# Patient Record
Sex: MALE | Race: WHITE | Employment: OTHER | URBAN - METROPOLITAN AREA
[De-identification: names, ages, dates, MRNs, and addresses within clinical notes are randomized per-mention and may not be internally consistent; named-entity substitution may affect disease eponyms.]

---

## 2018-03-28 ENCOUNTER — APPOINTMENT (OUTPATIENT)
Dept: GENERAL RADIOLOGY | Age: 68
End: 2018-03-28
Attending: EMERGENCY MEDICINE
Payer: MEDICARE

## 2018-03-28 ENCOUNTER — HOSPITAL ENCOUNTER (EMERGENCY)
Age: 68
Discharge: HOME | End: 2018-03-28
Attending: EMERGENCY MEDICINE | Admitting: EMERGENCY MEDICINE
Payer: MEDICARE

## 2018-03-28 VITALS
HEART RATE: 89 BPM | RESPIRATION RATE: 20 BRPM | OXYGEN SATURATION: 95 % | DIASTOLIC BLOOD PRESSURE: 69 MMHG | SYSTOLIC BLOOD PRESSURE: 154 MMHG | TEMPERATURE: 99.3 F

## 2018-03-28 DIAGNOSIS — R05.9 COUGH: Primary | ICD-10-CM

## 2018-03-28 DIAGNOSIS — Z85.05 HISTORY OF LIVER CANCER: ICD-10-CM

## 2018-03-28 LAB
ALBUMIN SERPL-MCNC: 4 G/DL (ref 3.4–5)
ALBUMIN/GLOB SERPL: 0.9 {RATIO} (ref 0.8–1.7)
ALP SERPL-CCNC: 76 U/L (ref 45–117)
ALT SERPL-CCNC: 39 U/L (ref 16–61)
ANION GAP SERPL CALC-SCNC: 6 MMOL/L (ref 3–18)
AST SERPL-CCNC: 55 U/L (ref 15–37)
BASOPHILS # BLD: 0 K/UL (ref 0–0.06)
BASOPHILS NFR BLD: 0 % (ref 0–2)
BILIRUB SERPL-MCNC: 0.7 MG/DL (ref 0.2–1)
BUN SERPL-MCNC: 7 MG/DL (ref 7–18)
BUN/CREAT SERPL: 12 (ref 12–20)
CALCIUM SERPL-MCNC: 8.9 MG/DL (ref 8.5–10.1)
CHLORIDE SERPL-SCNC: 95 MMOL/L (ref 100–108)
CO2 SERPL-SCNC: 28 MMOL/L (ref 21–32)
CREAT SERPL-MCNC: 0.57 MG/DL (ref 0.6–1.3)
DIFFERENTIAL METHOD BLD: ABNORMAL
EOSINOPHIL # BLD: 0 K/UL (ref 0–0.4)
EOSINOPHIL NFR BLD: 0 % (ref 0–5)
ERYTHROCYTE [DISTWIDTH] IN BLOOD BY AUTOMATED COUNT: 13.2 % (ref 11.6–14.5)
GLOBULIN SER CALC-MCNC: 4.4 G/DL (ref 2–4)
GLUCOSE SERPL-MCNC: 91 MG/DL (ref 74–99)
HCT VFR BLD AUTO: 35.2 % (ref 36–48)
HGB BLD-MCNC: 12 G/DL (ref 13–16)
LYMPHOCYTES # BLD: 0.2 K/UL (ref 0.9–3.6)
LYMPHOCYTES NFR BLD: 6 % (ref 21–52)
MCH RBC QN AUTO: 28.9 PG (ref 24–34)
MCHC RBC AUTO-ENTMCNC: 34.1 G/DL (ref 31–37)
MCV RBC AUTO: 84.8 FL (ref 74–97)
MONOCYTES # BLD: 0.4 K/UL (ref 0.05–1.2)
MONOCYTES NFR BLD: 11 % (ref 3–10)
NEUTS SEG # BLD: 3 K/UL (ref 1.8–8)
NEUTS SEG NFR BLD: 83 % (ref 40–73)
PLATELET # BLD AUTO: 87 K/UL (ref 135–420)
PMV BLD AUTO: 10 FL (ref 9.2–11.8)
POTASSIUM SERPL-SCNC: 4.2 MMOL/L (ref 3.5–5.5)
PROT SERPL-MCNC: 8.4 G/DL (ref 6.4–8.2)
RBC # BLD AUTO: 4.15 M/UL (ref 4.7–5.5)
SODIUM SERPL-SCNC: 129 MMOL/L (ref 136–145)
WBC # BLD AUTO: 3.6 K/UL (ref 4.6–13.2)

## 2018-03-28 PROCEDURE — 85025 COMPLETE CBC W/AUTO DIFF WBC: CPT | Performed by: EMERGENCY MEDICINE

## 2018-03-28 PROCEDURE — 71046 X-RAY EXAM CHEST 2 VIEWS: CPT

## 2018-03-28 PROCEDURE — 87040 BLOOD CULTURE FOR BACTERIA: CPT | Performed by: EMERGENCY MEDICINE

## 2018-03-28 PROCEDURE — 74011250637 HC RX REV CODE- 250/637: Performed by: EMERGENCY MEDICINE

## 2018-03-28 PROCEDURE — 80053 COMPREHEN METABOLIC PANEL: CPT | Performed by: EMERGENCY MEDICINE

## 2018-03-28 PROCEDURE — 99283 EMERGENCY DEPT VISIT LOW MDM: CPT

## 2018-03-28 RX ORDER — CETIRIZINE HCL 10 MG
10 TABLET ORAL DAILY
Qty: 10 TAB | Refills: 0 | Status: SHIPPED | OUTPATIENT
Start: 2018-03-28 | End: 2019-03-04

## 2018-03-28 RX ORDER — GUAIFENESIN/DEXTROMETHORPHAN 100-10MG/5
10 SYRUP ORAL
Status: DISCONTINUED | OUTPATIENT
Start: 2018-03-28 | End: 2018-03-29 | Stop reason: HOSPADM

## 2018-03-28 RX ORDER — GUAIFENESIN 600 MG/1
600 TABLET, EXTENDED RELEASE ORAL 2 TIMES DAILY
Qty: 20 TAB | Refills: 0 | Status: SHIPPED | OUTPATIENT
Start: 2018-03-28 | End: 2019-03-04

## 2018-03-28 RX ORDER — DOXYCYCLINE 100 MG/1
100 CAPSULE ORAL
Status: COMPLETED | OUTPATIENT
Start: 2018-03-28 | End: 2018-03-28

## 2018-03-28 RX ORDER — DOXYCYCLINE HYCLATE 100 MG
100 TABLET ORAL 2 TIMES DAILY
Qty: 20 TAB | Refills: 0 | Status: SHIPPED | OUTPATIENT
Start: 2018-03-28 | End: 2018-04-07

## 2018-03-28 RX ADMIN — DOXYCYCLINE 100 MG: 100 CAPSULE ORAL at 20:39

## 2018-03-28 NOTE — ED TRIAGE NOTES
\"For about four days I been coughing and have the chills and runny nose. I think I have pneumonia. The medication Nexavar that I'm on breaks down the immune system. \"

## 2018-04-03 LAB
BACTERIA SPEC CULT: NORMAL
BACTERIA SPEC CULT: NORMAL
SERVICE CMNT-IMP: NORMAL
SERVICE CMNT-IMP: NORMAL

## 2018-10-03 ENCOUNTER — OFFICE VISIT (OUTPATIENT)
Dept: FAMILY MEDICINE CLINIC | Facility: CLINIC | Age: 68
End: 2018-10-03

## 2018-10-03 VITALS
HEIGHT: 66 IN | RESPIRATION RATE: 17 BRPM | TEMPERATURE: 97.3 F | OXYGEN SATURATION: 96 % | HEART RATE: 74 BPM | SYSTOLIC BLOOD PRESSURE: 129 MMHG | WEIGHT: 151 LBS | BODY MASS INDEX: 24.27 KG/M2 | DIASTOLIC BLOOD PRESSURE: 58 MMHG
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
## 2018-10-03 DIAGNOSIS — S61.052A CAT BITE OF LEFT THUMB, INITIAL ENCOUNTER: Primary | ICD-10-CM

## 2018-10-03 RX ORDER — TRAZODONE HYDROCHLORIDE 50 MG/1
TABLET ORAL
COMMUNITY
Start: 2018-09-06 | End: 2019-03-04

## 2018-10-03 RX ORDER — AMOXICILLIN AND CLAVULANATE POTASSIUM 875; 125 MG/1; MG/1
1 TABLET, FILM COATED ORAL 2 TIMES DAILY
Qty: 14 TAB | Refills: 0 | Status: SHIPPED | OUTPATIENT
Start: 2018-10-03 | End: 2018-10-10

## 2018-10-03 NOTE — PROGRESS NOTES
Michel Barger is a 77 y.o.  male presents today for office visit for acute care. Pt would also like to discuss cat bite. Pt is not fasting. Pt is in Room # 9      1. Have you been to the ER, urgent care clinic since your last visit? Hospitalized since your last visit? No    2. Have you seen or consulted any other health care providers outside of the [address] [place] [address] since your last visit? Include any pap smears or colon screening.  No    Upcoming Appts  none    Health Maintenance reviewed     VORB:   Orders Placed This Encounter    traZODone (DESYREL) 50 mg tablet   Annelise Chatman LPN

## 2018-10-03 NOTE — PROGRESS NOTES
Subjective:      Reji Estes is an 77 y.o. male who presents for evaluation of a bite wound to the left thumb. History of bite: cat bite. Injury occurred 7 days ago. The patient reports no coldness, no numbness, no pain, no paresthesias. There were no other injuries. The tetanus status is up to date. He states his cat is up to date on it's immunizations including rabies. Patient states he was playing with his cat, and the cat scratched his left thumb with it's teeth. There was no bleeding, or open wound only a scratch. He states he had three antibiotic pills left over from an old prescription and he took all three. There is no problem list on file for this patient. There are no active problems to display for this patient. Current Outpatient Prescriptions   Medication Sig Dispense Refill    amoxicillin-clavulanate (AUGMENTIN) 875-125 mg per tablet Take 1 Tab by mouth two (2) times a day for 7 days. 14 Tab 0    traZODone (DESYREL) 50 mg tablet        Not on File  Past Medical History:   Diagnosis Date    Cancer ([facility] [address]     Hypertension      History reviewed. No pertinent surgical history. History reviewed. No pertinent family history. Social History   Substance Use Topics    Smoking status: Current Every Day Smoker    Smokeless tobacco: Never Used    Alcohol use No        Objective:        Visit Vitals    /58 (BP 1 Location: Right arm, BP Patient Position: Sitting)    Pulse 74    Temp 97.3 °F (36.3 °C) (Oral)    Resp 17    Ht 5' 6\" (1.676 m)    Wt 151 lb (68.5 kg)    SpO2 96%    BMI 24.37 kg/m2     There is a linear bite wound measuring approximately 0.1 cm in length on the left thumb. Examination of the wound for foreign bodies and devitalized tissue showed none. Examination of the surrounding area for neural or vascular damage showed none. There is no signs or symptoms of infection, the area is scabbed over. Assessment/Plan:       ICD-10-CM ICD-9-CM    1.  Cat bite of left thumb, initial encounter S61.052A 883.0 amoxicillin-clavulanate (AUGMENTIN) 875-125 mg per tablet    W55. 01XA E906.3      Bite as described above. Tetanus immunization indicated: up to date. Rabies risk: none    Follow-up with PCP in 7 days. I have discussed the diagnosis with the patient and the intended plan as seen in the above orders. I have discussed the medication with the patient. The patient has received an after-visit summary along with patient information handout.   Pt verbalized understanding.       Felix TAMAYOC  [facility]  [address] [place], [address]  Phone [phone]  Fax [phone]

## 2018-10-03 NOTE — PATIENT INSTRUCTIONS
Animal Bites: Care Instructions  Your Care Instructions  After an animal bite, the biggest concern is infection. The chance of infection depends on the type of animal that bit you, where on your body you were bitten, and your general health. Many animal bites are not closed with stitches, because this can increase the chance of infection. Your bite may take as little as 7 days or as long as several months to heal, depending on how bad it is. Taking good care of your wound at home will help it heal and reduce your chance of infection. The doctor has checked you carefully, but problems can develop later. If you notice any problems or new symptoms, get medical treatment right away. Follow-up care is a key part of your treatment and safety. Be sure to make and go to all appointments, and call your doctor if you are having problems. It's also a good idea to know your test results and keep a list of the medicines you take. How can you care for yourself at home? · If your doctor told you how to care for your wound, follow your doctor's instructions. If you did not get instructions, follow this general advice:  ¨ After 24 to 48 hours, gently wash the wound with clean water 2 times a day. Do not scrub or soak the wound. Don't use hydrogen peroxide or alcohol, which can slow healing. ¨ You may cover the wound with a thin layer of petroleum jelly, such as Vaseline, and a nonstick bandage. ¨ Apply more petroleum jelly and replace the bandage as needed. · After you shower, gently dry the wound with a clean towel. · If your doctor has closed the wound, cover the bandage with a plastic bag before you take a shower. · A small amount of skin redness and swelling around the wound edges and the stitches or staples is normal. Your wound may itch or feel irritated. Do not scratch or rub the wound.   · Ask your doctor if you can take an over-the-counter pain medicine, such as acetaminophen (Tylenol), ibuprofen (Advil, Motrin), or naproxen (Aleve). Read and follow all instructions on the label. · Do not take two or more pain medicines at the same time unless the doctor told you to. Many pain medicines have acetaminophen, which is Tylenol. Too much acetaminophen (Tylenol) can be harmful. · If your bite puts you at risk for rabies, you will get a series of shots over the next few weeks to prevent rabies. Your doctor will tell you when to get the shots. It is very important that you get the full cycle of shots. Follow your doctor's instructions exactly. · You may need a tetanus shot if you have not received one in the last 5 years. · If your doctor prescribed antibiotics, take them as directed. Do not stop taking them just because you feel better. You need to take the full course of antibiotics. When should you call for help? Call your doctor now or seek immediate medical care if:    · The skin near the bite turns cold or pale or it changes color.     · You lose feeling in the area near the bite, or it feels numb or tingly.     · You have trouble moving a limb near the bite.     · You have symptoms of infection, such as:  ¨ Increased pain, swelling, warmth, or redness near the wound. ¨ Red streaks leading from the wound. ¨ Pus draining from the wound. ¨ A fever.     · Blood soaks through the bandage. Oozing small amounts of blood is normal.     · Your pain is getting worse.    Watch closely for changes in your health, and be sure to contact your doctor if you are not getting better as expected. Where can you learn more? Go to http://peyman-jun.info/. Enter H261 in the search box to learn more about \"Animal Bites: Care Instructions. \"  Current as of: November 20, 2017  Content Version: 11.7  © 7031-1844 [employer]. Care instructions adapted under license by [employer] (which disclaims liability or warranty for this information).  If you have questions about a medical condition or this instruction, always ask your healthcare professional. [place] [address] any warranty or liability for your use of this information.

## 2018-10-03 NOTE — MR AVS SNAPSHOT
[address] [address] 
[phone] Patient: Terence Cadena MRN: [id number] QDK:9/30/1920 Visit Information Date & Time Provider Department Dept. Phone Encounter #  
 10/3/2018  2:00 PM Hugo Ramírez  Ina Eddy [phone] [id number] Follow-up Instructions Return if symptoms worsen or fail to improve. Upcoming Health Maintenance Date Due Hepatitis C Screening 1950 DTaP/Tdap/Td series (1 - Tdap) 2/14/1969 Shingrix Vaccine Age 50> (1 of 2) 2/14/1998 FOBT Q 1 YEAR AGE 50-75 2/14/1998 GLAUCOMA SCREENING Q2Y 2/14/2013 Pneumococcal 65+ Low/Medium Risk (1 of 2 - PCV13) 2/14/2013 Influenza Age 5 to Adult 8/1/2018 Allergies as of 10/3/2018  Review Complete On: 10/3/2018 By: Hugo Ramírez NP Not on File Current Immunizations  Never Reviewed No immunizations on file. Not reviewed this visit You Were Diagnosed With   
  
 Codes Comments Cat bite of left thumb, initial encounter    -  Primary ICD-10-CM: R47.869J, W55. April [facility] ICD-9-CM: 883.0, E906.3 Vitals BP Pulse Temp Resp Height(growth percentile) Weight(growth percentile) 129/58 (BP 1 Location: Right arm, BP Patient Position: Sitting) 74 97.3 °F (36.3 °C) (Oral) 17 5' 6\" (1.676 m) 151 lb (68.5 kg) SpO2 BMI Smoking Status 96% 24.37 kg/m2 Current Every Day Smoker BMI and BSA Data Body Mass Index Body Surface Area  
 24.37 kg/m 2 1.79 m 2 Preferred Pharmacy Pharmacy Name Phone [phone] [facility] [phone] Your Updated Medication List  
  
   
This list is accurate as of 10/3/18  2:14 PM.  Always use your most recent med list.  
  
  
  
  
 amoxicillin-clavulanate 875-125 mg per tablet Commonly known as:  AUGMENTIN Take 1 Tab by mouth two (2) times a day for 7 days. traZODone 50 mg tablet Commonly known as:  Christine [place] Prescriptions Sent to Pharmacy Refills  
 amoxicillin-clavulanate (AUGMENTIN) 875-125 mg per tablet 0 Sig: Take 1 Tab by mouth two (2) times a day for 7 days. Class: Normal  
 [facility]  #: [phone] Route: Oral  
  
Follow-up Instructions Return if symptoms worsen or fail to improve. Patient Instructions Animal Bites: Care Instructions Your Care Instructions After an animal bite, the biggest concern is infection. The chance of infection depends on the type of animal that bit you, where on your body you were bitten, and your general health. Many animal bites are not closed with stitches, because this can increase the chance of infection. Your bite may take as little as 7 days or as long as several months to heal, depending on how bad it is. Taking good care of your wound at home will help it heal and reduce your chance of infection. The doctor has checked you carefully, but problems can develop later. If you notice any problems or new symptoms, get medical treatment right away. Follow-up care is a key part of your treatment and safety. Be sure to make and go to all appointments, and call your doctor if you are having problems. It's also a good idea to know your test results and keep a list of the medicines you take. How can you care for yourself at home? · If your doctor told you how to care for your wound, follow your doctor's instructions. If you did not get instructions, follow this general advice: ¨ After 24 to 48 hours, gently wash the wound with clean water 2 times a day. Do not scrub or soak the wound. Don't use hydrogen peroxide or alcohol, which can slow healing. ¨ You may cover the wound with a thin layer of petroleum jelly, such as Vaseline, and a nonstick bandage. ¨ Apply more petroleum jelly and replace the bandage as needed. · After you shower, gently dry the wound with a clean towel. · If your doctor has closed the wound, cover the bandage with a plastic bag before you take a shower. · A small amount of skin redness and swelling around the wound edges and the stitches or staples is normal. Your wound may itch or feel irritated. Do not scratch or rub the wound. · Ask your doctor if you can take an over-the-counter pain medicine, such as acetaminophen (Tylenol), ibuprofen (Advil, Motrin), or naproxen (Aleve). Read and follow all instructions on the label. · Do not take two or more pain medicines at the same time unless the doctor told you to. Many pain medicines have acetaminophen, which is Tylenol. Too much acetaminophen (Tylenol) can be harmful. · If your bite puts you at risk for rabies, you will get a series of shots over the next few weeks to prevent rabies. Your doctor will tell you when to get the shots. It is very important that you get the full cycle of shots. Follow your doctor's instructions exactly. · You may need a tetanus shot if you have not received one in the last 5 years. · If your doctor prescribed antibiotics, take them as directed. Do not stop taking them just because you feel better. You need to take the full course of antibiotics. When should you call for help? Call your doctor now or seek immediate medical care if: 
  · The skin near the bite turns cold or pale or it changes color.  
  · You lose feeling in the area near the bite, or it feels numb or tingly.  
  · You have trouble moving a limb near the bite.  
  · You have symptoms of infection, such as: 
¨ Increased pain, swelling, warmth, or redness near the wound. ¨ Red streaks leading from the wound. ¨ Pus draining from the wound. ¨ A fever.  
  · Blood soaks through the bandage. Oozing small amounts of blood is normal.  
  · Your pain is getting worse.  Watch closely for changes in your health, and be sure to contact your doctor if you are not getting better as expected. Where can you learn more? Go to http://peyman-jun.info/. Enter L454 in the search box to learn more about \"Animal Bites: Care Instructions. \" Current as of: November 20, 2017 Content Version: 11.7 © 0123-5094 [employer]. Care instructions adapted under license by [employer] (which disclaims liability or warranty for this information). If you have questions about a medical condition or this instruction, always ask your healthcare professional. [address] any warranty or liability for your use of this information. Introducing [facility]! [facility] introduces CHEQROOM patient portal. Now you can access parts of your medical record, email your doctor's office, and request medication refills online. 1. In your internet browser, go to https://For Your Imagination. [employer]/For Your Imagination 2. Click on the First Time User? Click Here link in the Sign In box. You will see the New Member Sign Up page. 3. Enter your CHEQROOM Access Code exactly as it appears below. You will not need to use this code after youve completed the sign-up process. If you do not sign up before the expiration date, you must request a new code. · CHEQROOM Access Code: [email or web address] Expires: 1/1/2019  2:13 PM 
 
4. Enter the last four digits of your Social Security Number (xxxx) and Date of Birth (mm/dd/yyyy) as indicated and click Submit. You will be taken to the next sign-up page. 5. Create a Medocityt ID. This will be your CHEQROOM login ID and cannot be changed, so think of one that is secure and easy to remember. 6. Create a CHEQROOM password. You can change your password at any time. 7. Enter your Password Reset Question and Answer. This can be used at a later time if you forget your password. 8. Enter your e-mail address. You will receive e-mail notification when new information is available in [address] 9. Click Sign Up. You can now view and download portions of your medical record. 10. Click the Download Summary menu link to download a portable copy of your medical information. If you have questions, please visit the Frequently Asked Questions section of the Tradier website. Remember, Tradier is NOT to be used for urgent needs. For medical emergencies, dial 911. Now available from your iPhone and Android! Please provide this summary of care documentation to your next provider. If you have any questions after today's visit, please call [phone].

## 2019-03-04 ENCOUNTER — HOSPITAL ENCOUNTER (EMERGENCY)
Age: 69
Discharge: HOME | End: 2019-03-04
Attending: EMERGENCY MEDICINE
Payer: MEDICARE

## 2019-03-04 ENCOUNTER — APPOINTMENT (OUTPATIENT)
Dept: CT IMAGING | Age: 69
End: 2019-03-04
Attending: EMERGENCY MEDICINE
Payer: MEDICARE

## 2019-03-04 VITALS
HEART RATE: 79 BPM | DIASTOLIC BLOOD PRESSURE: 79 MMHG | WEIGHT: 113 LBS | RESPIRATION RATE: 20 BRPM | TEMPERATURE: 97.4 F | OXYGEN SATURATION: 100 % | SYSTOLIC BLOOD PRESSURE: 153 MMHG | BODY MASS INDEX: 18.16 KG/M2 | HEIGHT: 66 IN

## 2019-03-04 DIAGNOSIS — R10.13 ABDOMINAL PAIN, EPIGASTRIC: Primary | ICD-10-CM

## 2019-03-04 LAB
ALBUMIN SERPL-MCNC: 2.9 G/DL (ref 3.4–5)
ALBUMIN/GLOB SERPL: 0.8 {RATIO} (ref 0.8–1.7)
ALP SERPL-CCNC: 130 U/L (ref 45–117)
ALT SERPL-CCNC: 158 U/L (ref 16–61)
ANION GAP SERPL CALC-SCNC: 9 MMOL/L (ref 3–18)
APPEARANCE UR: CLEAR
AST SERPL-CCNC: 112 U/L (ref 15–37)
BASOPHILS # BLD: 0 K/UL (ref 0–0.1)
BASOPHILS NFR BLD: 0 % (ref 0–2)
BILIRUB SERPL-MCNC: 2.6 MG/DL (ref 0.2–1)
BILIRUB UR QL: NEGATIVE
BUN SERPL-MCNC: 21 MG/DL (ref 7–18)
BUN/CREAT SERPL: 34 (ref 12–20)
CALCIUM SERPL-MCNC: 8.3 MG/DL (ref 8.5–10.1)
CHLORIDE SERPL-SCNC: 100 MMOL/L (ref 100–108)
CO2 SERPL-SCNC: 26 MMOL/L (ref 21–32)
COLOR UR: YELLOW
CREAT SERPL-MCNC: 0.61 MG/DL (ref 0.6–1.3)
DIFFERENTIAL METHOD BLD: ABNORMAL
EOSINOPHIL # BLD: 0 K/UL (ref 0–0.4)
EOSINOPHIL NFR BLD: 0 % (ref 0–5)
ERYTHROCYTE [DISTWIDTH] IN BLOOD BY AUTOMATED COUNT: 14.2 % (ref 11.6–14.5)
GLOBULIN SER CALC-MCNC: 3.8 G/DL (ref 2–4)
GLUCOSE SERPL-MCNC: 118 MG/DL (ref 74–99)
GLUCOSE UR STRIP.AUTO-MCNC: NEGATIVE MG/DL
HCT VFR BLD AUTO: 38.8 % (ref 36–48)
HGB BLD-MCNC: 12.5 G/DL (ref 13–16)
HGB UR QL STRIP: NEGATIVE
KETONES UR QL STRIP.AUTO: 15 MG/DL
LEUKOCYTE ESTERASE UR QL STRIP.AUTO: NEGATIVE
LIPASE SERPL-CCNC: 162 U/L (ref 73–393)
LYMPHOCYTES # BLD: 0.2 K/UL (ref 0.9–3.6)
LYMPHOCYTES NFR BLD: 2 % (ref 21–52)
MAGNESIUM SERPL-MCNC: 2.1 MG/DL (ref 1.6–2.6)
MCH RBC QN AUTO: 24.3 PG (ref 24–34)
MCHC RBC AUTO-ENTMCNC: 32.2 G/DL (ref 31–37)
MCV RBC AUTO: 75.5 FL (ref 74–97)
MONOCYTES # BLD: 0.1 K/UL (ref 0.05–1.2)
MONOCYTES NFR BLD: 1 % (ref 3–10)
NEUTS SEG # BLD: 9.8 K/UL (ref 1.8–8)
NEUTS SEG NFR BLD: 97 % (ref 40–73)
NITRITE UR QL STRIP.AUTO: NEGATIVE
PH UR STRIP: 6.5 [PH] (ref 5–8)
PLATELET # BLD AUTO: 143 K/UL (ref 135–420)
PMV BLD AUTO: 8.5 FL (ref 9.2–11.8)
POTASSIUM SERPL-SCNC: 4.6 MMOL/L (ref 3.5–5.5)
PROT SERPL-MCNC: 6.7 G/DL (ref 6.4–8.2)
PROT UR STRIP-MCNC: NEGATIVE MG/DL
RBC # BLD AUTO: 5.14 M/UL (ref 4.7–5.5)
SODIUM SERPL-SCNC: 135 MMOL/L (ref 136–145)
SP GR UR REFRACTOMETRY: 1.02 (ref 1–1.03)
TROPONIN I SERPL-MCNC: 0.04 NG/ML (ref 0–0.04)
UROBILINOGEN UR QL STRIP.AUTO: 1 EU/DL (ref 0.2–1)
WBC # BLD AUTO: 10.1 K/UL (ref 4.6–13.2)

## 2019-03-04 PROCEDURE — 93005 ELECTROCARDIOGRAM TRACING: CPT

## 2019-03-04 PROCEDURE — 74177 CT ABD & PELVIS W/CONTRAST: CPT

## 2019-03-04 PROCEDURE — 80053 COMPREHEN METABOLIC PANEL: CPT

## 2019-03-04 PROCEDURE — 83690 ASSAY OF LIPASE: CPT

## 2019-03-04 PROCEDURE — 81003 URINALYSIS AUTO W/O SCOPE: CPT

## 2019-03-04 PROCEDURE — 74011636320 HC RX REV CODE- 636/320: Performed by: EMERGENCY MEDICINE

## 2019-03-04 PROCEDURE — 83735 ASSAY OF MAGNESIUM: CPT

## 2019-03-04 PROCEDURE — 96361 HYDRATE IV INFUSION ADD-ON: CPT

## 2019-03-04 PROCEDURE — 99283 EMERGENCY DEPT VISIT LOW MDM: CPT

## 2019-03-04 PROCEDURE — 74011250636 HC RX REV CODE- 250/636: Performed by: EMERGENCY MEDICINE

## 2019-03-04 PROCEDURE — 74011250636 HC RX REV CODE- 250/636: Performed by: PHYSICIAN ASSISTANT

## 2019-03-04 PROCEDURE — 85025 COMPLETE CBC W/AUTO DIFF WBC: CPT

## 2019-03-04 PROCEDURE — 96374 THER/PROPH/DIAG INJ IV PUSH: CPT

## 2019-03-04 PROCEDURE — 84484 ASSAY OF TROPONIN QUANT: CPT

## 2019-03-04 RX ORDER — MAG HYDROX/ALUMINUM HYD/SIMETH 200-200-20
30 SUSPENSION, ORAL (FINAL DOSE FORM) ORAL
Qty: 354 ML | Refills: 0 | Status: SHIPPED | OUTPATIENT
Start: 2019-03-04 | End: 2019-03-04

## 2019-03-04 RX ORDER — LIDOCAINE HYDROCHLORIDE 20 MG/ML
15 SOLUTION OROPHARYNGEAL AS NEEDED
Qty: 1 BOTTLE | Refills: 0 | Status: SHIPPED | OUTPATIENT
Start: 2019-03-04

## 2019-03-04 RX ORDER — ONDANSETRON 2 MG/ML
4 INJECTION INTRAMUSCULAR; INTRAVENOUS
Status: COMPLETED | OUTPATIENT
Start: 2019-03-04 | End: 2019-03-04

## 2019-03-04 RX ORDER — MAG HYDROX/ALUMINUM HYD/SIMETH 200-200-20
30 SUSPENSION, ORAL (FINAL DOSE FORM) ORAL
Qty: 354 ML | Refills: 0 | Status: SHIPPED | OUTPATIENT
Start: 2019-03-04

## 2019-03-04 RX ADMIN — ONDANSETRON 4 MG: 2 INJECTION INTRAMUSCULAR; INTRAVENOUS at 15:39

## 2019-03-04 RX ADMIN — SODIUM CHLORIDE 1000 ML: 900 INJECTION, SOLUTION INTRAVENOUS at 15:39

## 2019-03-04 RX ADMIN — IOPAMIDOL 100 ML: 612 INJECTION, SOLUTION INTRAVENOUS at 17:20

## 2019-03-04 NOTE — ED TRIAGE NOTES
Chemo pt w/ burned insides from meds, unable to eat. Diarrhea. New chemo causing same gastric distress. weak

## 2019-03-04 NOTE — ED PROVIDER NOTES
EMERGENCY DEPARTMENT HISTORY AND PHYSICAL EXAM 
 
3:08 PM 
 
 
Date: 3/4/2019 Patient Name: Gabino Kerr History of Presenting Illness Chief Complaint Patient presents with  Abdominal Pain  Diarrhea History Provided By: Patient Additional History (Context): Gabino Kerr is a 68 y.o. male with cancer who presents with 1 week of abdominal pain x1 week with severe nausea/vomting. He states that his symptoms have been so severe that he has not been able to eat in days. With every day that passes without eating, the pt reports that he continues to feel increasingly weaker. The pt reports that 9 days ago he started a new chemotherapy drug (unspefified) with known side effects consistent with the symptoms he presents today. He reports that he contacted his oncologist, Dr. Thompson Bhandari at [facility], regarding his symptoms, and was told to discontinue using the new chemo drug after the pt had already taken 5 doses. He has stopped taking the drug but continues to have symptoms. The pt reports that he took one of his own Tramadol's at home for the pain but reports that his symptoms were not alleviated by it. The patient presents no further medical complaints or concerns to the ED at this time. PCP: Salena Washington MD 
 
Chief Complaint: Abdominal Pain Duration:  Days Timing:  Worsening Location: Diffuse Quality: Aching Modifying Factors: Pain symptoms not improved with tramafol Associated Symptoms: nausea/vomiting Past History Past Medical History: 
Past Medical History:  
Diagnosis Date  Cancer ([address]  Hypertension Past Surgical History: 
History reviewed. No pertinent surgical history. Family History: 
History reviewed. No pertinent family history. Social History: 
Social History Tobacco Use  Smoking status: Current Every Day Smoker  Smokeless tobacco: Never Used Substance Use Topics  Alcohol use: No  
 Drug use:  No  
 
 
 Allergies: 
No Known Allergies Review of Systems Review of Systems Constitutional: Negative for chills. HENT: Negative for congestion and sore throat. Respiratory: Negative for cough and shortness of breath. Cardiovascular: Negative for chest pain. Gastrointestinal: Positive for abdominal pain. Negative for diarrhea, nausea and vomiting. Genitourinary: Negative for decreased urine volume and dysuria. Musculoskeletal: Negative for back pain. Skin: Negative for rash. Neurological: Negative for dizziness and headaches. All other systems reviewed and are negative. Physical Exam  
 
Visit Vitals /79 (BP 1 Location: Right arm, BP Patient Position: At rest) Pulse 79 Temp 97.4 °F (36.3 °C) Resp 20 Ht 5' 6\" (1.676 m) Wt 51.3 kg (113 lb) SpO2 100% BMI 18.24 kg/m² General Exam: Patient appears frail, elderly, and cachectic. He is in no acute distress. Eye Exam: Lids and conjunctiva are normal 
ENT Exam: The general head and facial exam is normal.  The neck is supple without meningeal signs. No significant adenopathy. Pulmonary Exam: No respiratory distress. The respiratory rate is normal.  No stridor. The breath sounds are equal bilaterally. There are no wheezes, rales, or rhonchi noted. Cardiac Exam: The cardiac rate and rhythm are normal.  No significant murmurs, rubs, or gallops. The peripheral pulses are normal. 
Abdominal Exam: Abdomen is soft and non-distended. No pulsatile masses. There is no local tenderness. There is no rebound or guarding noted. Skin and Soft Tissue: The skin is warm and dry, without significant abnormality. Good color. Musculoskeletal Exam: No peripheral edema. The musculoskeletal exam of the lower extremities is normal without significant local tenderness. Psychiatric: Normal adult with appropriate demeanor and interpersonal interaction. Is oriented to person, place, and time. Diagnostic Study Results Labs - 
 Recent Results (from the past 12 hour(s)) CBC WITH AUTOMATED DIFF Collection Time: 03/04/19  2:50 PM  
Result Value Ref Range WBC 10.1 4.6 - 13.2 K/uL  
 RBC 5.14 4.70 - 5.50 M/uL  
 HGB 12.5 (L) 13.0 - 16.0 g/dL HCT 38.8 36.0 - 48.0 % MCV 75.5 74.0 - 97.0 FL  
 MCH 24.3 24.0 - 34.0 PG  
 MCHC 32.2 31.0 - 37.0 g/dL  
 RDW 14.2 11.6 - 14.5 % PLATELET 071 144 - 635 K/uL MPV 8.5 (L) 9.2 - 11.8 FL  
 NEUTROPHILS 97 (H) 40 - 73 % LYMPHOCYTES 2 (L) 21 - 52 % MONOCYTES 1 (L) 3 - 10 % EOSINOPHILS 0 0 - 5 % BASOPHILS 0 0 - 2 %  
 ABS. NEUTROPHILS 9.8 (H) 1.8 - 8.0 K/UL  
 ABS. LYMPHOCYTES 0.2 (L) 0.9 - 3.6 K/UL  
 ABS. MONOCYTES 0.1 0.05 - 1.2 K/UL  
 ABS. EOSINOPHILS 0.0 0.0 - 0.4 K/UL  
 ABS. BASOPHILS 0.0 0.0 - 0.1 K/UL  
 DF AUTOMATED METABOLIC PANEL, COMPREHENSIVE Collection Time: 03/04/19  2:50 PM  
Result Value Ref Range Sodium 135 (L) 136 - 145 mmol/L Potassium 4.6 3.5 - 5.5 mmol/L Chloride 100 100 - 108 mmol/L  
 CO2 26 21 - 32 mmol/L Anion gap 9 3.0 - 18 mmol/L Glucose 118 (H) 74 - 99 mg/dL BUN 21 (H) 7.0 - 18 MG/DL Creatinine 0.61 0.6 - 1.3 MG/DL  
 BUN/Creatinine ratio 34 (H) 12 - 20 GFR est AA >60 >60 ml/min/1.73m2 GFR est non-AA >60 >60 ml/min/1.73m2 Calcium 8.3 (L) 8.5 - 10.1 MG/DL Bilirubin, total 2.6 (H) 0.2 - 1.0 MG/DL  
 ALT (SGPT) 158 (H) 16 - 61 U/L  
 AST (SGOT) 112 (H) 15 - 37 U/L Alk. phosphatase 130 (H) 45 - 117 U/L Protein, total 6.7 6.4 - 8.2 g/dL Albumin 2.9 (L) 3.4 - 5.0 g/dL Globulin 3.8 2.0 - 4.0 g/dL A-G Ratio 0.8 0.8 - 1.7 MAGNESIUM Collection Time: 03/04/19  2:50 PM  
Result Value Ref Range Magnesium 2.1 1.6 - 2.6 mg/dL LIPASE Collection Time: 03/04/19  2:50 PM  
Result Value Ref Range Lipase 162 73 - 393 U/L  
TROPONIN I Collection Time: 03/04/19  2:50 PM  
Result Value Ref Range  Troponin-I, QT 0.04 0.0 - 0.045 NG/ML  
URINALYSIS W/ RFLX MICROSCOPIC  
 Collection Time: 03/04/19  4:38 PM  
Result Value Ref Range Color YELLOW Appearance CLEAR Specific gravity 1.024 1.005 - 1.030    
 pH (UA) 6.5 5.0 - 8.0 Protein NEGATIVE  NEG mg/dL Glucose NEGATIVE  NEG mg/dL Ketone 15 (A) NEG mg/dL Bilirubin NEGATIVE  NEG Blood NEGATIVE  NEG Urobilinogen 1.0 0.2 - 1.0 EU/dL Nitrites NEGATIVE  NEG Leukocyte Esterase NEGATIVE  NEG    
EKG, 12 LEAD, INITIAL Collection Time: 03/04/19  5:35 PM  
Result Value Ref Range Ventricular Rate 67 BPM  
 Atrial Rate 67 BPM  
 P-R Interval 120 ms QRS Duration 126 ms  
 Q-T Interval 462 ms QTC Calculation (Bezet) 488 ms Calculated P Axis 70 degrees Calculated R Axis -60 degrees Calculated T Axis 34 degrees Diagnosis Normal sinus rhythm Possible Left atrial enlargement Right bundle branch block Left anterior fascicular block Bifascicular block Possible Lateral infarct , age undetermined Abnormal ECG No previous ECGs available Radiologic Studies -  
CT ABD PELV W CONT Final Result IMPRESSION:  
  
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 Stable  
abnormal appearance of the right hepatic lobe likely related to the patient's  
hepatocellular carcinoma and posttreatment changes. No gross interval change  
from the prior exam. Dr. Karla Hutchinson informed 5:52 PM March 4, 2019. Exact cause of the  
patient's acute abdominal pain unclear. Medical Decision Making It should be noted that I, Mery Holt MD will be the provider of record for this patient. I reviewed the vital signs, available nursing notes, past medical history, past surgical history, family history and social history. Vital Signs-Reviewed the patient's vital signs. Pulse Oximetry Analysis -  100% on room air (Interpretation), wnl 
 
Cardiac Monitor: 
Rate: 79 bpm 
 
Records Reviewed: Nursing Notes (Time of Review: 3:08 PM) ED Course: Progress Notes, Reevaluation, and Consults: 
 
Provider Notes (Medical Decision Making): Pt with liver cancer on chemo presenting with abdominal pain. Abdominal exam benign. Pt reports pain is only after eating. Labs reviewed and appear as expected and at baseline for pt. EKG and trop obtained to rule out atypical presentation of ACS. CT abd/pelv without significant changes from previous. Pt feeling better with fluids and requesting discharge as his wife is ill. Has appt with GI on Thursday. Encouraged to try bland diet. AWare to return to ED with any concerns. Pt ambulatory in ED, abdominal exam soft and non tender on repeat. Diagnosis Clinical Impression: 1. Abdominal pain, epigastric Disposition: Discharge home Follow-up Information Follow up With Specialties Details Why Contact Info Warden Bernice MD Family Practice Schedule an appointment as soon as possible for a visit  [address] [facility] [address] [facility] [address] 
[phone] [facility] EMERGENCY DEPT Emergency Medicine  If symptoms worsen [address] 
[phone] Apple Garcia MD Gastroenterology, Internal Medicine Schedule an appointment as soon as possible for a visit  [address] [facility] [address] [phone] Medication List  
  
START taking these medications   
alum-mag hydroxide-simeth 200-200-20 mg/5 mL Susp Commonly known as:  MYLANTA Take 30 mL by mouth three (3) times daily (with meals). Where to Get Your Medications These medications were sent to Brandan Leung at [address], [address] [place] [address], [facility] [id number] Phone:  [phone] · alum-mag hydroxide-simeth 200-200-20 mg/5 mL Susp 
  
 
_______________________________ Scribe Attestation Alfa Jimenez acting as a scribe for and in the presence of Yoni Millan MD     
March 04, 2019 at 3:08 PM 
    
 Provider Attestation:     
I personally performed the services described in the documentation, reviewed the documentation, as recorded by the scribe in my presence, and it accurately and completely records my words and actions. March 04, 2019 at 3:08 PM - Sophie Painting MD   
 
 
_______________________________

## 2019-03-04 NOTE — DISCHARGE INSTRUCTIONS
Patient Education        Indigestion (Dyspepsia or Heartburn): Care Instructions  Your Care Instructions  Sometimes it can be hard to pinpoint the cause of indigestion. (It is also called dyspepsia or heartburn.) Most cases of an upset stomach with bloating, burning, burping, and nausea are minor and go away within several hours. Home treatment and over-the-counter medicine often are able to control symptoms. But if you take medicine to relieve your indigestion without making diet and lifestyle changes, your symptoms are likely to return again and again. If you get indigestion often, it may be a sign of a more serious medical problem. Be sure to follow up with your doctor, who may want to do tests to be sure of the cause of your indigestion. Follow-up care is a key part of your treatment and safety. Be sure to make and go to all appointments, and call your doctor if you are having problems. It's also a good idea to know your test results and keep a list of the medicines you take. How can you care for yourself at home? · Your doctor may recommend over-the-counter medicine. For mild or occasional indigestion, antacids such as Gaviscon, Mylanta, Maalox, or Tums, may help. Be safe with medicines. Be careful when you take over-the-counter antacid medicines. Many of these medicines have aspirin in them. Read the label to make sure that you are not taking more than the recommended dose. Too much aspirin can be harmful. · Your doctor also may recommend over-the-counter acid reducers, such as Pepcid AC, Tagamet HB, Zantac 75, or Prilosec. Read and follow all instructions on the label. If you use these medicines often, talk with your doctor. · Change your eating habits. ? It's best to eat several small meals instead of two or three large meals. ? After you eat, wait 2 to 3 hours before you lie down. ? Chocolate, mint, and alcohol can make GERD worse. ?  Spicy foods, foods that have a lot of acid (like tomatoes and oranges), and coffee can make GERD symptoms worse in some people. If your symptoms are worse after you eat a certain food, you may want to stop eating that food to see if your symptoms get better. · Do not smoke or chew tobacco. Smoking can make GERD worse. If you need help quitting, talk to your doctor about stop-smoking programs and medicines. These can increase your chances of quitting for good. · If you have GERD symptoms at night, raise the head of your bed 6 to 8 inches. You can do this by putting the frame on blocks or placing a foam wedge under the head of your mattress. (Adding extra pillows does not work.)  · Do not wear tight clothing around your middle. · Lose weight if you need to. Losing just 5 to 10 pounds can help. · Do not take anti-inflammatory medicines, such as aspirin, ibuprofen (Advil, Motrin), or naproxen (Aleve). These can irritate the stomach. If you need a pain medicine, try acetaminophen (Tylenol), which does not cause stomach upset. When should you call for help? Call your doctor now or seek immediate medical care if:    · You have new or worse belly pain.     · You are vomiting.    Watch closely for changes in your health, and be sure to contact your doctor if:    · You have new or worse symptoms of indigestion.     · You have trouble or pain swallowing.     · You are losing weight.     · You do not get better as expected. Where can you learn more? Go to http://peyman-jun.info/. Enter N985 in the search box to learn more about \"Indigestion (Dyspepsia or Heartburn): Care Instructions. \"  Current as of: March 27, 2018  Content Version: 11.9  © 4499-2061 [employer]. Care instructions adapted under license by [employer] (which disclaims liability or warranty for this information).  If you have questions about a medical condition or this instruction, always ask your healthcare professional. [employer] disclaims any warranty or liability for your use of this information. Patient Education        Abdominal Pain: Care Instructions  Your Care Instructions    Abdominal pain has many possible causes. Some aren't serious and get better on their own in a few days. Others need more testing and treatment. If your pain continues or gets worse, you need to be rechecked and may need more tests to find out what is wrong. You may need surgery to correct the problem. Don't ignore new symptoms, such as fever, nausea and vomiting, urination problems, pain that gets worse, and dizziness. These may be signs of a more serious problem. Your doctor may have recommended a follow-up visit in the next 8 to 12 hours. If you are not getting better, you may need more tests or treatment. The doctor has checked you carefully, but problems can develop later. If you notice any problems or new symptoms, get medical treatment right away. Follow-up care is a key part of your treatment and safety. Be sure to make and go to all appointments, and call your doctor if you are having problems. It's also a good idea to know your test results and keep a list of the medicines you take. How can you care for yourself at home? · Rest until you feel better. · To prevent dehydration, drink plenty of fluids, enough so that your urine is light yellow or clear like water. Choose water and other caffeine-free clear liquids until you feel better. If you have kidney, heart, or liver disease and have to limit fluids, talk with your doctor before you increase the amount of fluids you drink. · If your stomach is upset, eat mild foods, such as rice, dry toast or crackers, bananas, and applesauce. Try eating several small meals instead of two or three large ones. · Wait until 48 hours after all symptoms have gone away before you have spicy foods, alcohol, and drinks that contain caffeine. · Do not eat foods that are high in fat.   · Avoid anti-inflammatory medicines such as aspirin, ibuprofen (Advil, Motrin), and naproxen (Aleve). These can cause stomach upset. Talk to your doctor if you take daily aspirin for another health problem. When should you call for help? Call 911 anytime you think you may need emergency care. For example, call if:    · You passed out (lost consciousness).     · You pass maroon or very bloody stools.     · You vomit blood or what looks like coffee grounds.     · You have new, severe belly pain.    Call your doctor now or seek immediate medical care if:    · Your pain gets worse, especially if it becomes focused in one area of your belly.     · You have a new or higher fever.     · Your stools are black and look like tar, or they have streaks of blood.     · You have unexpected vaginal bleeding.     · You have symptoms of a urinary tract infection. These may include:  ? Pain when you urinate. ? Urinating more often than usual.  ? Blood in your urine.     · You are dizzy or lightheaded, or you feel like you may faint.    Watch closely for changes in your health, and be sure to contact your doctor if:    · You are not getting better after 1 day (24 hours). Where can you learn more? Go to http://peyman-jun.info/. Enter L258 in the search box to learn more about \"Abdominal Pain: Care Instructions. \"  Current as of: September 23, 2018  Content Version: 11.9  © 1573-4399 [employer]. Care instructions adapted under license by [employer] (which disclaims liability or warranty for this information). If you have questions about a medical condition or this instruction, always ask your healthcare professional. [address] any warranty or liability for your use of this information.

## 2019-03-06 LAB
ATRIAL RATE: 67 BPM
CALCULATED P AXIS, ECG09: 73 DEGREES
CALCULATED R AXIS, ECG10: -60 DEGREES
CALCULATED T AXIS, ECG11: 36 DEGREES
DIAGNOSIS, 93000: NORMAL
P-R INTERVAL, ECG05: 122 MS
Q-T INTERVAL, ECG07: 456 MS
QRS DURATION, ECG06: 128 MS
QTC CALCULATION (BEZET), ECG08: 481 MS
VENTRICULAR RATE, ECG03: 67 BPM

## 2019-05-17 ENCOUNTER — APPOINTMENT (OUTPATIENT)
Dept: CT IMAGING | Age: 69
DRG: 871 | End: 2019-05-17
Attending: EMERGENCY MEDICINE
Payer: MEDICARE

## 2019-05-17 ENCOUNTER — APPOINTMENT (OUTPATIENT)
Dept: GENERAL RADIOLOGY | Age: 69
DRG: 871 | End: 2019-05-17
Attending: EMERGENCY MEDICINE
Payer: MEDICARE

## 2019-05-17 ENCOUNTER — HOSPITAL ENCOUNTER (INPATIENT)
Age: 69
LOS: 5 days | Discharge: HOSPICE | DRG: 871 | End: 2019-05-22
Attending: EMERGENCY MEDICINE | Admitting: INTERNAL MEDICINE
Payer: MEDICARE

## 2019-05-17 ENCOUNTER — APPOINTMENT (OUTPATIENT)
Dept: CT IMAGING | Age: 69
DRG: 871 | End: 2019-05-17
Attending: PHYSICIAN ASSISTANT
Payer: MEDICARE

## 2019-05-17 ENCOUNTER — APPOINTMENT (OUTPATIENT)
Dept: VASCULAR SURGERY | Age: 69
DRG: 871 | End: 2019-05-17
Attending: EMERGENCY MEDICINE
Payer: MEDICARE

## 2019-05-17 ENCOUNTER — APPOINTMENT (OUTPATIENT)
Dept: GENERAL RADIOLOGY | Age: 69
DRG: 871 | End: 2019-05-17
Attending: INTERNAL MEDICINE
Payer: MEDICARE

## 2019-05-17 DIAGNOSIS — A41.9 SEPTIC SHOCK (HCC): Primary | ICD-10-CM

## 2019-05-17 DIAGNOSIS — G93.40 ENCEPHALOPATHY: ICD-10-CM

## 2019-05-17 DIAGNOSIS — K72.01 ACUTE LIVER FAILURE WITH HEPATIC COMA (HCC): ICD-10-CM

## 2019-05-17 DIAGNOSIS — R65.21 SEPTIC SHOCK (HCC): Primary | ICD-10-CM

## 2019-05-17 PROBLEM — E86.0 DEHYDRATION: Status: ACTIVE | Noted: 2019-05-17
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
## 2019-05-17 PROBLEM — M25.532 WRIST PAIN, ACUTE, LEFT: Status: ACTIVE | Noted: 2019-05-17

## 2019-05-17 PROBLEM — I10 HTN (HYPERTENSION): Status: ACTIVE | Noted: 2019-05-17

## 2019-05-17 PROBLEM — C22.9 LIVER CANCER (HCC): Status: ACTIVE | Noted: 2019-05-17

## 2019-05-17 PROBLEM — R41.82 ALTERED MENTAL STATE: Status: ACTIVE | Noted: 2019-05-17

## 2019-05-17 PROBLEM — K72.90 LIVER FAILURE (HCC): Status: ACTIVE | Noted: 2019-05-17

## 2019-05-17 LAB
ALBUMIN SERPL-MCNC: 2.3 G/DL (ref 3.4–5)
ALBUMIN/GLOB SERPL: 0.5 {RATIO} (ref 0.8–1.7)
ALP SERPL-CCNC: 426 U/L (ref 45–117)
ALT SERPL-CCNC: 3237 U/L (ref 16–61)
AMMONIA PLAS-SCNC: 31 UMOL/L (ref 11–32)
ANION GAP SERPL CALC-SCNC: 11 MMOL/L (ref 3–18)
APAP SERPL-MCNC: <2 UG/ML (ref 10–30)
APPEARANCE UR: CLEAR
AST SERPL-CCNC: 8740 U/L (ref 15–37)
BACTERIA URNS QL MICRO: ABNORMAL /HPF
BASOPHILS # BLD: 0 K/UL (ref 0–0.1)
BASOPHILS NFR BLD: 0 % (ref 0–3)
BILIRUB SERPL-MCNC: 2.4 MG/DL (ref 0.2–1)
BILIRUB UR QL: ABNORMAL
BUN SERPL-MCNC: 21 MG/DL (ref 7–18)
BUN/CREAT SERPL: 22 (ref 12–20)
CALCIUM SERPL-MCNC: 8.2 MG/DL (ref 8.5–10.1)
CHLORIDE SERPL-SCNC: 98 MMOL/L (ref 100–108)
CK MB CFR SERPL CALC: 0.3 % (ref 0–4)
CK MB SERPL-MCNC: 10.1 NG/ML (ref 5–25)
CK SERPL-CCNC: 3381 U/L (ref 39–308)
CO2 SERPL-SCNC: 25 MMOL/L (ref 21–32)
COLOR UR: ABNORMAL
CREAT SERPL-MCNC: 0.95 MG/DL (ref 0.6–1.3)
DIFFERENTIAL METHOD BLD: ABNORMAL
EOSINOPHIL # BLD: 0 K/UL (ref 0–0.4)
EOSINOPHIL NFR BLD: 0 % (ref 0–5)
EPITH CASTS URNS QL MICRO: ABNORMAL /LPF (ref 0–5)
ERYTHROCYTE [DISTWIDTH] IN BLOOD BY AUTOMATED COUNT: 20.5 % (ref 11.6–14.5)
GLOBULIN SER CALC-MCNC: 4.3 G/DL (ref 2–4)
GLUCOSE SERPL-MCNC: 86 MG/DL (ref 74–99)
GLUCOSE UR STRIP.AUTO-MCNC: NEGATIVE MG/DL
HCT VFR BLD AUTO: 31.4 % (ref 36–48)
HGB BLD-MCNC: 9.8 G/DL (ref 13–16)
HGB UR QL STRIP: ABNORMAL
INR PPP: 1.5 (ref 0.8–1.2)
KETONES UR QL STRIP.AUTO: NEGATIVE MG/DL
LACTATE BLD-SCNC: 2.35 MMOL/L (ref 0.4–2)
LACTATE BLD-SCNC: 4.55 MMOL/L (ref 0.4–2)
LEUKOCYTE ESTERASE UR QL STRIP.AUTO: NEGATIVE
LYMPHOCYTES # BLD: 0.5 K/UL (ref 0.8–3.5)
LYMPHOCYTES NFR BLD: 5 % (ref 20–51)
MCH RBC QN AUTO: 24.9 PG (ref 24–34)
MCHC RBC AUTO-ENTMCNC: 31.2 G/DL (ref 31–37)
MCV RBC AUTO: 79.9 FL (ref 74–97)
MONOCYTES # BLD: 0.3 K/UL (ref 0–1)
MONOCYTES NFR BLD: 3 % (ref 2–9)
MUCOUS THREADS URNS QL MICRO: ABNORMAL /LPF
NEUTS BAND NFR BLD MANUAL: 11 % (ref 0–5)
NEUTS SEG # BLD: 8.3 K/UL (ref 1.8–8)
NEUTS SEG NFR BLD: 81 % (ref 42–75)
NITRITE UR QL STRIP.AUTO: NEGATIVE
NRBC BLD-RTO: 2 PER 100 WBC
PH UR STRIP: 6 [PH] (ref 5–8)
PLATELET # BLD AUTO: 133 K/UL (ref 135–420)
PLATELET COMMENTS,PCOM: ABNORMAL
PMV BLD AUTO: 8.9 FL (ref 9.2–11.8)
POTASSIUM SERPL-SCNC: 3.8 MMOL/L (ref 3.5–5.5)
PROT SERPL-MCNC: 6.6 G/DL (ref 6.4–8.2)
PROT UR STRIP-MCNC: 30 MG/DL
PROTHROMBIN TIME: 17.6 SEC (ref 11.5–15.2)
RBC # BLD AUTO: 3.93 M/UL (ref 4.7–5.5)
RBC #/AREA URNS HPF: ABNORMAL /HPF (ref 0–5)
RBC MORPH BLD: ABNORMAL
RBC MORPH BLD: ABNORMAL
SODIUM SERPL-SCNC: 134 MMOL/L (ref 136–145)
SP GR UR REFRACTOMETRY: 1.02 (ref 1–1.03)
TROPONIN I SERPL-MCNC: 1.07 NG/ML (ref 0–0.04)
TROPONIN I SERPL-MCNC: 1.4 NG/ML (ref 0–0.04)
UROBILINOGEN UR QL STRIP.AUTO: 1 EU/DL (ref 0.2–1)
WBC # BLD AUTO: 10.2 K/UL (ref 4.6–13.2)
WBC URNS QL MICRO: ABNORMAL /HPF (ref 0–4)

## 2019-05-17 PROCEDURE — 73100 X-RAY EXAM OF WRIST: CPT

## 2019-05-17 PROCEDURE — 72125 CT NECK SPINE W/O DYE: CPT

## 2019-05-17 PROCEDURE — 77030005534 HC CATH URETH FOL TEMP SENS SIMS -B

## 2019-05-17 PROCEDURE — 74011250636 HC RX REV CODE- 250/636: Performed by: EMERGENCY MEDICINE

## 2019-05-17 PROCEDURE — 51702 INSERT TEMP BLADDER CATH: CPT

## 2019-05-17 PROCEDURE — 74011250636 HC RX REV CODE- 250/636: Performed by: INTERNAL MEDICINE

## 2019-05-17 PROCEDURE — 85610 PROTHROMBIN TIME: CPT

## 2019-05-17 PROCEDURE — 93970 EXTREMITY STUDY: CPT

## 2019-05-17 PROCEDURE — 96368 THER/DIAG CONCURRENT INF: CPT

## 2019-05-17 PROCEDURE — 82550 ASSAY OF CK (CPK): CPT

## 2019-05-17 PROCEDURE — 83605 ASSAY OF LACTIC ACID: CPT

## 2019-05-17 PROCEDURE — 99285 EMERGENCY DEPT VISIT HI MDM: CPT

## 2019-05-17 PROCEDURE — 74011000258 HC RX REV CODE- 258: Performed by: EMERGENCY MEDICINE

## 2019-05-17 PROCEDURE — 74011000258 HC RX REV CODE- 258: Performed by: INTERNAL MEDICINE

## 2019-05-17 PROCEDURE — 36415 COLL VENOUS BLD VENIPUNCTURE: CPT

## 2019-05-17 PROCEDURE — 87077 CULTURE AEROBIC IDENTIFY: CPT

## 2019-05-17 PROCEDURE — 71260 CT THORAX DX C+: CPT

## 2019-05-17 PROCEDURE — 93005 ELECTROCARDIOGRAM TRACING: CPT

## 2019-05-17 PROCEDURE — 85025 COMPLETE CBC W/AUTO DIFF WBC: CPT

## 2019-05-17 PROCEDURE — 71045 X-RAY EXAM CHEST 1 VIEW: CPT

## 2019-05-17 PROCEDURE — 77030011256 HC DRSG MEPILEX <16IN NO BORD MOLN -A

## 2019-05-17 PROCEDURE — 77030037878 HC DRSG MEPILEX >48IN BORD MOLN -B

## 2019-05-17 PROCEDURE — 96367 TX/PROPH/DG ADDL SEQ IV INF: CPT

## 2019-05-17 PROCEDURE — 77030037877 HC DRSG MEPILEX >48IN BORD MOLN -A

## 2019-05-17 PROCEDURE — 96365 THER/PROPH/DIAG IV INF INIT: CPT

## 2019-05-17 PROCEDURE — 80074 ACUTE HEPATITIS PANEL: CPT

## 2019-05-17 PROCEDURE — 77030037870 HC GLD SHT PREVALON SAGE -B

## 2019-05-17 PROCEDURE — 87522 HEPATITIS C REVRS TRNSCRPJ: CPT

## 2019-05-17 PROCEDURE — 74011250637 HC RX REV CODE- 250/637: Performed by: EMERGENCY MEDICINE

## 2019-05-17 PROCEDURE — 87040 BLOOD CULTURE FOR BACTERIA: CPT

## 2019-05-17 PROCEDURE — 73560 X-RAY EXAM OF KNEE 1 OR 2: CPT

## 2019-05-17 PROCEDURE — 65660000001 HC RM ICU INTERMED STEPDOWN

## 2019-05-17 PROCEDURE — 70450 CT HEAD/BRAIN W/O DYE: CPT

## 2019-05-17 PROCEDURE — 77030020186 HC BOOT HL PROTCT SAGE -B

## 2019-05-17 PROCEDURE — 73200 CT UPPER EXTREMITY W/O DYE: CPT

## 2019-05-17 PROCEDURE — 80307 DRUG TEST PRSMV CHEM ANLYZR: CPT

## 2019-05-17 PROCEDURE — 87186 SC STD MICRODIL/AGAR DIL: CPT

## 2019-05-17 PROCEDURE — 81001 URINALYSIS AUTO W/SCOPE: CPT

## 2019-05-17 PROCEDURE — 80053 COMPREHEN METABOLIC PANEL: CPT

## 2019-05-17 PROCEDURE — 82140 ASSAY OF AMMONIA: CPT

## 2019-05-17 PROCEDURE — 74011636320 HC RX REV CODE- 636/320: Performed by: EMERGENCY MEDICINE

## 2019-05-17 RX ORDER — SODIUM CHLORIDE, SODIUM LACTATE, POTASSIUM CHLORIDE, CALCIUM CHLORIDE 600; 310; 30; 20 MG/100ML; MG/100ML; MG/100ML; MG/100ML
150 INJECTION, SOLUTION INTRAVENOUS CONTINUOUS
Status: DISCONTINUED | OUTPATIENT
Start: 2019-05-17 | End: 2019-05-22 | Stop reason: HOSPADM

## 2019-05-17 RX ORDER — LEVOFLOXACIN 5 MG/ML
750 INJECTION, SOLUTION INTRAVENOUS EVERY 24 HOURS
Status: DISCONTINUED | OUTPATIENT
Start: 2019-05-17 | End: 2019-05-17

## 2019-05-17 RX ORDER — VANCOMYCIN/0.9 % SOD CHLORIDE 1.5G/250ML
1500 PLASTIC BAG, INJECTION (ML) INTRAVENOUS ONCE
Status: COMPLETED | OUTPATIENT
Start: 2019-05-17 | End: 2019-05-17

## 2019-05-17 RX ORDER — ENOXAPARIN SODIUM 150 MG/ML
1 INJECTION SUBCUTANEOUS EVERY 12 HOURS
Status: CANCELLED | OUTPATIENT
Start: 2019-05-17

## 2019-05-17 RX ORDER — SODIUM CHLORIDE 0.9 % (FLUSH) 0.9 %
5-10 SYRINGE (ML) INJECTION AS NEEDED
Status: DISCONTINUED | OUTPATIENT
Start: 2019-05-17 | End: 2019-05-22 | Stop reason: HOSPADM

## 2019-05-17 RX ORDER — HYDROMORPHONE HYDROCHLORIDE 1 MG/ML
0.2 INJECTION, SOLUTION INTRAMUSCULAR; INTRAVENOUS; SUBCUTANEOUS
Status: DISCONTINUED | OUTPATIENT
Start: 2019-05-17 | End: 2019-05-18

## 2019-05-17 RX ORDER — ACETAMINOPHEN 650 MG/1
650 SUPPOSITORY RECTAL
Status: COMPLETED | OUTPATIENT
Start: 2019-05-17 | End: 2019-05-17

## 2019-05-17 RX ADMIN — IOPAMIDOL 84 ML: 612 INJECTION, SOLUTION INTRAVENOUS at 14:54

## 2019-05-17 RX ADMIN — SODIUM CHLORIDE 701 ML: 900 INJECTION, SOLUTION INTRAVENOUS at 12:34

## 2019-05-17 RX ADMIN — VANCOMYCIN HYDROCHLORIDE 1500 MG: 10 INJECTION, POWDER, LYOPHILIZED, FOR SOLUTION INTRAVENOUS at 13:16

## 2019-05-17 RX ADMIN — HYDROMORPHONE HYDROCHLORIDE 0.2 MG: 1 INJECTION, SOLUTION INTRAMUSCULAR; INTRAVENOUS; SUBCUTANEOUS at 21:33

## 2019-05-17 RX ADMIN — SODIUM CHLORIDE 1000 ML: 900 INJECTION, SOLUTION INTRAVENOUS at 12:34

## 2019-05-17 RX ADMIN — LEVOFLOXACIN 750 MG: 5 INJECTION, SOLUTION INTRAVENOUS at 13:27

## 2019-05-17 RX ADMIN — ACETAMINOPHEN 650 MG: 650 SUPPOSITORY RECTAL at 12:49

## 2019-05-17 RX ADMIN — PIPERACILLIN SODIUM,TAZOBACTAM SODIUM 4.5 G: 4; .5 INJECTION, POWDER, FOR SOLUTION INTRAVENOUS at 12:42

## 2019-05-17 RX ADMIN — PIPERACILLIN SODIUM,TAZOBACTAM SODIUM 4.5 G: 4; .5 INJECTION, POWDER, FOR SOLUTION INTRAVENOUS at 18:13

## 2019-05-17 RX ADMIN — SODIUM CHLORIDE, SODIUM LACTATE, POTASSIUM CHLORIDE, AND CALCIUM CHLORIDE 150 ML/HR: 600; 310; 30; 20 INJECTION, SOLUTION INTRAVENOUS at 16:39

## 2019-05-17 NOTE — ED TRIAGE NOTES
Patient arrives via EMS with c/o left hand pain and generalized weakness. Patient found by [employer] on the floor this morning after family sent them over for a welfare check. EMS came out to check him out and patient refused at that time to come to the hospital. EMS called out a second time per [employer] and APS, patient agreed to come and get checked out. APS at bedside at this time. Patient usually drives and gets himself around, patient having problems with incontinence and falling. Patient taking oral chemotherapy for liver cancer.

## 2019-05-17 NOTE — ED NOTES
TRANSFER - ED to INPATIENT REPORT: 
 
SBAR report made available to receiving floor on this patient being transferred to [facility] ICU [id number])  for routine progression of care Admitting diagnosis Sepsis ([facility] [address] [A41.9] Information from the following report(s) SBAR, ED Summary and MAR was made available to receiving floor. Lines:  
Peripheral IV 05/17/19 Right Antecubital (Active) Site Assessment Clean, dry, & intact 5/17/2019  1:02 PM  
Phlebitis Assessment 0 5/17/2019  1:02 PM  
Infiltration Assessment 0 5/17/2019  1:02 PM  
Dressing Status Clean, dry, & intact 5/17/2019  1:02 PM  
Dressing Type Transparent 5/17/2019  1:02 PM  
Hub Color/Line Status Pink;Patent; Flushed 5/17/2019  1:02 PM  
  
 
 
Patient is oriented to time, place, person, and situation Valuables transported with patient MAP (Monitor): 72 =Monitored (most recent) Vitals w/ MEWS Score (last day) Date/Time MEWS Score Pulse Resp Temp BP Level of Consciousness SpO2  
 05/17/19 1530  1  89  18  98.4 °F (36.9 °C)  124/56  Alert  100 % 05/17/19 1515    87  18  98.6 °F (37 °C)  127/59    99 % 05/17/19 1507          125/64    99 % 05/17/19 1415          136/69      
 05/17/19 1400    95  19  99.6 °F (37.6 °C)  128/54    97 % 05/17/19 1345    92  22  99.9 °F (37.7 °C)  126/55    97 % 05/17/19 1330    89  19  100.2 °F (37.9 °C)  120/59    97 % 05/17/19 1315    89  20  100.3 °F (37.9 °C)  128/59    97 % 05/17/19 1300    94  17    131/64    97 % 05/17/19 1245    95  20    141/64    96 % 05/17/19 1230    101  (Abnormal)   16    131/68    97 % 05/17/19 1215    99  19    127/74    98 % 05/17/19 1213        100.7 °F (38.2 °C)  (Abnormal)         
 05/17/19 1200    105  (Abnormal)   28    126/51    99 % 05/17/19 1150  1  99  18  97.8 °F (36.6 °C)  124/75  Alert  96 % Septic Patients:  
 
Lactic Acid Lab Results Component Value Date [facility] 2.[facility] 05/17/2019 LACPOC 4.55 [facility] 05/17/2019  
 (Most recent on top) Repeat drawn: YES Time: 1536 ALL LACTIC ACIDS GREATER THAN 2 MUST BE REPEATED POC WITHIN 4 HOURS OR PRIOR TO ADMISSION Total Fluid Bolus initiated and documented on MAR: YES All ordered antibiotics initiated within first 3 hours of TIME ZERO?   YES
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
## 2019-05-17 NOTE — CONSULTS
Orthopaedic Consult    CC:  Left wrist pain greater than 1 week              HPI:  Jazmín Greene is a 68 y.o. male who has been admitted for medical reasons. Patient without elevated WBC since admission. Positive bacteria in urine. Consult placed for left wrist pain. Patient with history of recent trauma after chart review; [address] [place] [address] injury approximately 1 week ago; patient confirms this and states pain has been consistent since the fall. This prompted a PCP yesterday for imaging. No hospital images taken prior to consult. Images taken yesterday were read as negative; I am unable to see more than the report. Patient with possible fall again since yesterday       History:  Past Medical History:   Diagnosis Date    Cancer ([facility] [address]     Hypertension      History reviewed. No pertinent surgical history. No Known Allergies      Review of Systems:    General: see IM note, patient interactive with my exam and answers questions. Musculoskeletal:  Left wrist pain after a fall 1 week ago    Physical Assessment:  Blood pressure 121/57, pulse 85, temperature 98.2 °F (36.8 °C), resp. rate 22, height 5' 6\" (1.676 m), weight 62.7 kg (138 lb 3.7 oz), SpO2 98 %. CBC w/Diff    Lab Results   Component Value Date/Time    WBC 10.2 05/17/2019 12:20 PM    RBC 3.93 (L) 05/17/2019 12:20 PM    HGB 9.8 (L) 05/17/2019 12:20 PM    HCT 31.4 (L) 05/17/2019 12:20 PM    MCV 79.9 05/17/2019 12:20 PM    MCH 24.9 05/17/2019 12:20 PM    MCHC 31.2 05/17/2019 12:20 PM    RDW 20.5 (H) 05/17/2019 12:20 PM    Lab Results   Component Value Date/Time    BANDS 11 (H) 05/17/2019 12:20 PM    MONOS 3 05/17/2019 12:20 PM    EOS 0 05/17/2019 12:20 PM    BASOS 0 05/17/2019 12:20 PM    RDW 20.5 (H) 05/17/2019 12:20 PM          Coagulation   Lab Results   Component Value Date    INR 1.5 (H) 05/17/2019            Exam:  General:  Alert   Extremities:  LUE - Neurovascularly intact. Compartments soft.  Diffuse TTP noted overlying wrist, specifically overlying distal ulna. NO palpable erythema or effusion. Able to wiggle fingers. Denies paresthesias. Palpable radial and ulnar pulses noted. LT Intact over deltoid, radial, median and ulnar distribution. Skin:   Intact proximal to left wrist           Radiology:   Left wrist pending     Right wrist comparison views pending    Assessment:   1. Left wrist pain, traumatic   2. Suspect DRUJ disruption from initial bedside xray views  3. NO current suspicion for LUE septic arthritis     Plan:  1. Continue left wrist splint  2. NWB LUE wrist  3. CT left radius/ulna and to include hand  4. Pain management     Thank you for allowing us to assist in this patient's care.   Manish Paul, [address] [place] [address]  5/17/2019  Office: [phone]

## 2019-05-17 NOTE — CONSULTS
Infectious Disease Consultation Note    Date of Admission: 5/17/2019    Date of Consultation: 5/17/2019     Will plan to check back on Monday 5/20 but will be available by phone this weekend. I can be reached at [phone] if needed. Referred by: Dr. Valeri Coats       Reason for Referral: ?sepsis       Current Antimicrobials: Prior Antimicrobials   None 5/17 - 0 Vanc, zosyn levofloxacin 5/17 - 0     Assessment / Plan:     Low grade fever  - suspect from inflammation related to left wrist dislocation, RLE non-occlusive DVT  - not septic -> dc Vancomycin, Zosyn, Levofloxacin  -> monitor blood cultures   Elevated Transaminases  - suspect shock liver (may have been hypotensive overnight PTA but no documented low BPs since admission)     Fall  - re-injured left wrist (previously injured 1 week PTA) -> per Ortho   Hepatocellular CA s/p embolizaton    h/o treated Hepatitis C    h/o portal vein thombosis      Microbiology:       Lines / Catheters:       HPI:     67 y/o  male with hepatocellular CA s/p embolizaton, h/o treated Hepatitis C, h/o portal vein thombosis admitted to [facility] 5/17/2019 after being found down at home. One day PTA he visited [facility] for left wrist pain. He had fallen on his outstretched arms one week earlier. Xray showed soft tissue swelling and no fracture and he was described as being alert and oriented x 3. He was discharged home with a splint to continue Ultram.  He says he lost his balance on his front porch last nigh and fell again and took a long time dragging himself back into the house. He made it into the living room a stayed on the floor there all night. Today his family in [place], who usually speaks with him every night, called  EMS to check on him and he initially refused to come to the hospital but after being seen by APS, EMS came again and took him to the ED.   Temperature was 100.7, pulse 105, WBC count 10.2 but he was felt to be septic and started on Vancomycin, Zosyn and levofloxacin. CTAP showed possible new RLL pulmonary nodule with small area of RLL atelectasis or infiltrate, persistent cirrhotic appearance of liver and hypoattenuation area possibly treated hepatocellular carcinoma. There is thrombus at the bifurcation of the main portal vein with complete occlusion of the right portal vein. Transaminases are highly elevated ALT 3237, AST 8740 and CPK is 3381. Acute non-occlussive DVT was found on PVL of the right lower extremity. Active Hospital Problems    Diagnosis Date Noted    Altered mental state 05/17/2019    Sepsis ([facility] [address] 05/17/2019    Wrist pain, acute, left 05/17/2019    Dehydration 05/17/2019    [email or web address] 05/17/2019    Liver cancer ([facility] 05/17/2019    HTN (hypertension) 05/17/2019    Fall 05/17/2019     Past Medical History:   Diagnosis Date    Cancer ([facility]     Hypertension      History reviewed. No pertinent surgical history. History reviewed. No pertinent family history.   Social History     Socioeconomic History    Marital status:      Spouse name: Not on file    Number of children: Not on file    Years of education: Not on file    Highest education level: Not on file   Occupational History    Not on file   Social Needs    Financial resource strain: Not on file    Food insecurity:     Worry: Not on file     Inability: Not on file    Transportation needs:     Medical: Not on file     Non-medical: Not on file   Tobacco Use    Smoking status: Current Every Day Smoker    Smokeless tobacco: Never Used   Substance and Sexual Activity    Alcohol use: No    Drug use: No    Sexual activity: Not Currently   Lifestyle    Physical activity:     Days per week: Not on file     Minutes per session: Not on file    Stress: Not on file   Relationships    Social connections:     Talks on phone: Not on file     Gets together: Not on file     Attends [employer] service: Not on file     Active member of club or organization: Not on file     Attends meetings of clubs or organizations: Not on file     Relationship status: Not on file    Intimate partner violence:     Fear of current or ex partner: Not on file     Emotionally abused: Not on file     Physically abused: Not on file     Forced sexual activity: Not on file   Other Topics Concern    Not on file   Social History Narrative    Not on file       Allergies:    Patient has no known allergies. .    Medications:     Current Facility-Administered Medications   Medication Dose Route Frequency    sodium chloride (NS) flush 5-10 mL  5-10 mL IntraVENous PRN    piperacillin-tazobactam (ZOSYN) 4.5 g in 0.9% sodium chloride (MBP/ADV) 100 mL MBP  4.5 g IntraVENous Q8H    levoFLOXacin (LEVAQUIN) 750 mg in D5W IVPB  750 mg IntraVENous Q24H    VANCOMYCIN INFORMATION NOTE   Other Rx Dosing/Monitoring    sodium chloride (NS) flush 5-10 mL  5-10 mL IntraVENous PRN    HYDROmorphone (DILAUDID) syringe 0.2 mg  0.2 mg IntraVENous Q4H PRN    lactated Ringers infusion  150 mL/hr IntraVENous CONTINUOUS    [START ON 2019] vancomycin (VANCOCIN) 1,000 mg in 0.9% sodium chloride (MBP/ADV) 250 mL adv  1,000 mg IntraVENous Q18H    [START ON 2019] VANCOMYCIN INFORMATION NOTE   Other ONCE       ROS:   A comprehensive review of systems was negative except for that written in the History of Present Illness. Physical Exam:     Temp (24hrs), Av.2 °F (37.3 °C), Min:97.8 °F (36.6 °C), Max:100.7 °F (38.2 °C)    Visit Vitals  /57   Pulse 85   Temp 98.2 °F (36.8 °C)   Resp 22   Ht 5' 6\" (1.676 m)   Wt 62.7 kg (138 lb 3.7 oz)   SpO2 98%   BMI 22.31 kg/m²       General: Well developed, well nourished 68 y.o.  male in no acute distress.   ENT: ENT exam normal, no neck nodes or sinus tenderness  Head: normocephalic, ecchymosis on chin  Mouth:  mucous membranes moist, pharynx normal without lesions  Neck: supple, symmetrical, trachea midline   Cardio:  regular rate and rhythm, S1, S2 normal, no murmur, click, rub or gallop  Chest: inspection normal - no chest wall deformities or tenderness, respiratory effort normal  Lungs: clear to auscultation and no wheezes or rales  Abdomen: soft, RUQ tender, Liver palpable around 8 cm below RSCM  Extremities:  Multiple abrasions over lower extremities L>R distal legs erythematous.  1+LE edema to posterior thighs  Neuro: alert, oriented, moves all extremities    Lab results:     Chemistry  Recent Labs     05/17/19  1220   GLU 86   *   K 3.8   CL 98*   CO2 25   BUN 21*   CREA 0.95   CA 8.2*   AGAP 11   BUCR 22*   *   TP 6.6   ALB 2.3*   GLOB 4.3*   AGRAT 0.5*       CBC w/ Diff  Recent Labs     05/17/19  1220   WBC 10.2   RBC 3.93*   HGB 9.8*   HCT 31.4*   *   GRANS 81*   LYMPH 5*   EOS 0       Microbiology  All Micro Results     Procedure Component Value Units Date/Time    CULTURE, BLOOD [[id number]] Collected:  05/17/19 1220    Order Status:  Completed Specimen:  Blood Updated:  05/17/19 1345    CULTURE, BLOOD [[id number]] Collected:  05/17/19 1235    Order Status:  Completed Specimen:  Blood Updated:  05/17/19 José Luis Fraire MD, [facility]  Infectious Disease Specialist  Pager [phone]

## 2019-05-17 NOTE — PROGRESS NOTES
Pt admitted from ER, see skin assessment. Orthopedics in to see, pt's left wrist is dislocated. Soft splint applied, removed by ortho for examination and reapplied.

## 2019-05-17 NOTE — ED PROVIDER NOTES
EMERGENCY DEPARTMENT HISTORY AND PHYSICAL EXAM 
 
12:05 PM 
 
 
Date: 5/17/2019 Patient Name: Maryjane Luu History of Presenting Illness Chief Complaint Patient presents with  Extremity Weakness History Provided By: patient Additional History (Context): Maryjane Luu is a 68 y.o. male presents with falls and incontinence, sent by police and Adult Protective Services. The patient himself has no idea why he is here. He inconsistently complains of extremity pain, and has abrasions indicating a fall at some point. History and review of systems limited by patient's confusion. PCP: Baldo Neil MD 
 
Chief Complaint:  
Duration:   
Timing: Location:  
Quality:  
Severity:  
Modifying Factors:  
Associated Symptoms:  
 
 
Current Outpatient Medications Medication Sig Dispense Refill  alum-mag hydroxide-simeth (MYLANTA) 200-200-20 mg/5 mL susp Take 30 mL by mouth three (3) times daily (with meals). 354 mL 0  
 lidocaine (LIDOCAINE VISCOUS) 2 % solution Take 15 mL by mouth as needed for Pain. 1 Bottle 0 Past History Past Medical History: 
Past Medical History:  
Diagnosis Date  Cancer ([facility] [address]  Hypertension Past Surgical History: 
History reviewed. No pertinent surgical history. Family History: 
History reviewed. No pertinent family history. Social History: 
Social History Tobacco Use  Smoking status: Current Every Day Smoker  Smokeless tobacco: Never Used Substance Use Topics  Alcohol use: No  
 Drug use: No  
 
 
Allergies: 
No Known Allergies Review of Systems Review of Systems Hematological: Bruises/bleeds easily. Physical Exam  
 
 
Patient Vitals for the past 12 hrs: 
 Temp Pulse Resp BP SpO2  
05/17/19 1150 97.8 °F (36.6 °C) 99 18 124/75 96 % Physical Exam  
Constitutional: He appears well-developed and well-nourished. Appears sleepy HENT:  
Head: Normocephalic. No palpable skull deformity, right face does have abrasions on it. Eyes: Conjunctivae are normal. No scleral icterus. Neck: Normal range of motion. Neck supple. No JVD present. Cardiovascular: Normal rate, regular rhythm and normal heart sounds. 4 intact extremity pulses Pulmonary/Chest: Effort normal and breath sounds normal.  
Abdominal: Soft. He exhibits no mass. There is no tenderness. Musculoskeletal: Normal range of motion. He exhibits edema and tenderness. Tenderness of bilateral knees with abrasions. Lymphadenopathy:  
  He has no cervical adenopathy. Neurological: He is alert. He has normal strength. No cranial nerve deficit or sensory deficit. Confused Skin: Skin is warm and dry. Nursing note and vitals reviewed. Diagnostic Study Results Labs - No results found for this or any previous visit (from the past 12 hour(s)). Radiologic Studies - No orders to display No results found. Medications ordered:  
Medications - No data to display Medical Decision Making Initial Medical Decision Making and DDx: 
   Evaluate for traumatic injury from multiple falls, new trauma, intracranial trauma. He does have a fever so evaluate for sepsis, code sepsis initiated, UTI pneumonia. KARL is likely he is probably dehydrated. ED Course: Progress Notes, Reevaluation, and Consults: 
ED Course as of May 19 1929 Fri May 17, 2019  
[facility]. Discussed bandemia fever, noted liver failure. CT scan still pending.  
 [CB] 1610 D/w dr Rj Morrell she has posterior tibial vein clot  
 [CB] ED Course User Index 
[CB] Veronique Malloy MD  
 
2:25 PM critical troponin I 1.07. In this context likely demand ischemia from the stress of being on the floor and. Consulting cardiology. No STEMI. 35 minutes critical care time for coordination of care management of critical illness encephalopathy dehydration septic shock. I am the first provider for this patient. I reviewed the vital signs, available nursing notes, past medical history, past surgical history, family history and social history. Patient Vitals for the past 12 hrs: 
 Temp Pulse Resp BP SpO2  
05/17/19 1150 97.8 °F (36.6 °C) 99 18 124/75 96 % Vital Signs-Reviewed the patient's vital signs. Pulse Oximetry Analysis, Cardiac Monitor, 12 lead ekg: 
   Sinus rhythm at 99, twelve-lead EKG at 1206, sinus rhythm at 99 no acute process, pulse ox 99% on room air Interpreted by the EP. Records Reviewed: Nursing notes reviewed (Time of Review: 12:05 PM) Procedures:  
Critical Care Time:  
Aspirin: (was aspirin given for stroke?) Diagnosis Clinical Impression: 1. Septic shock ([facility] [address] 2. Acute liver failure with hepatic coma ([facility] [address] 3. Encephalopathy Disposition: admitted Follow-up Information None Patient's Medications Start Taking No medications on file Continue Taking ALUM-MAG HYDROXIDE-SIMETH (MYLANTA) 200-200-20 MG/5 ML SUSP    Take 30 mL by mouth three (3) times daily (with meals). LIDOCAINE (LIDOCAINE VISCOUS) 2 % SOLUTION    Take 15 mL by mouth as needed for Pain. These Medications have changed No medications on file Stop Taking No medications on file  
 
_______________________________ Notes:   
Pancho Mark MD using Dragon dictation     
_______________________________

## 2019-05-17 NOTE — CONSULTS
Gastroenterology Consult    Patient: Amol Vee MRN: [id number]  SSN: [id number]    YOB: 1950  Age: 68 y.o. Sex: male        Assessment:   1) Acute liver injury with AST>ALT 8740/3237  2) Liver cancer, s/p prior treatments over the past several years, currently off therapy with progression of disease suggested by recent CT scan  3) Cirrhosis, presumably due to hepatitis C treated in the past  4) Recent falls, details unclear; ?period of time down at home  5) Recent weakness  6) Right posterior tibial vein thrombosis  7) Elevated troponin without chest pain    Mr Raoul Das has a complicated liver history and I am not confident of all the details but seems clear that he has cirrhosis and hepatocellular cancer s/p several attempts at therapy and not currently undergoing therapy. His bilirubin is actually not significant changed from March and I don't see evidence of hepatic encephalopathy and his markedly elevated transaminases are most consistent with acute liver injury as opposed to liver failure. The differential for this pattern of injury is fairly limited and includes ischemic injury, toxic/medication induced injury, and potentially acute viral infection. His tylenol level is negative. The most likely etiology is ischemic injury/hepatopathy associated with his recent falls/weakness and potential associated hypotension or relative hepatic hypoperfusion. He has portal venous thrombosis on imaging but this is chronic based on prior imaging and is related to tumor thrombus and I would not expect this to be the etiology for his acute liver injury pattern. Will send acute viral hepatitis panel and urine drug screen and will further evaluate his hepatic vasculature with doppler and will continue to monitor his hepatic function. If he does not improved rapidly, may need to contact [facility] to assist with clarifying the details of his liver cancer therapy.      Plan:   -Check acute hepatitis panel and urine drug screen, AFP  -Check doppler US of hepatic veins and artery  -Monitor liver enzymes, INR, and mental status  -Avoid hypotension  -No limitations to diet from my perspective  -Will follow      Subjective:      Letitia Reagan is a 68 y.o. male who is being seen for elevated liver enzymes, cirrhosis, and liver cancer. Many of the records in care-everywhere are not viewable and he has a complicated history of liver cancer which is pieced together from the patient's memory. He tells me he has cirrhosis which is secondary to hepatitis C which was treated in the past. I see negative viral loads from 2016 in the system. He is followed by Dr Anabel Salazar with GI and was diagnosed with liver cancer, notes indicate hepatocellular carcinoma, several years ago. He tells me he has had several treatments for this to include what sounds like TACE (chemoembolization) as well as systemic chemotherapy (he believes with sorafenib) and most recently with an immune-based therapy which ultimately caused colitis and had to be abandoned. He doesn't believe he has been on therapy for at least several months. He is followed at [facility]. There are records from [facility] but he states he was seen at [facility]. He is not aware of liver complications other than the cancer and he denies any history of GI bleeding, encephalopathy or ascites. His last EGD I can find was in 2017 showing portal hypertensive gastropathy, normal esophagus without not of varices. A 4mm gastric ulcer was seen with biopsies taken and clip placed. Biopsies show Hpylori. He reports recent few days of weakness and states he had a couple falls outside of his home last night after coming home from an [address] He states he is all banged up and specifically notes that his left wrist has been hurting and bilateral ankles are hurting.  As I review the chart after speaking with him, there is note from yesterday indicating a 1 week history of wrist pain after a fall although he did not mention prior falls to me. He tells me he took a long time before he was able to get himself inside last night and the history from there is unclear. He states that he lives alone since his wife  a few months ago an apparently family was concerned and the police and EMS came to evaluate the patient along with adult protective services and he was brought to the ER. He was apparently confused and he is unable to fill in the details of what happened overnight or today although he is awake and oriented when I speak with him today. Notes indicate that he had incontinence as well. He tells me he normally walks and drives and is actively working at [employer] in [employer]. He denies abdominal pain, diarrhea, constipation, blood in the stool, melena, nausea/vomiting. He denies tylenol use and states he hasn't had alcohol for 13 years. He takes no herbal supplements and is on no new medications. In the ER, he has had mild tachycardia with normal BP, tmax of 100.7  Labwork in the ER is notable for WBC 10.2, Hb 9.8, plt 133, large blood and 4+ bacteria on UA, INR 1.5, Trop 1.07, Creat 0.95, Bilirubin 2.4 (from 2.6 on 3/4/19), AST/ALT 8740/3237 with . CK is 3381. Ammonia is normal. Tylenol level is negative. Blood cultures are pending. CT head/c-spine show no acute findings. CT Chest/abd/pelvis show no acute traumatic injuries but note cirrhosis with ill-defined hypoattentuation in the right lobe related to treated cancer as well as extensive new hypoattenuation involving the left lobe as well as thrombus at the bifurcation of the main portal vein with complete occlusion of the right portal vein. Cholelithiasis without cholecystitis is noted and new small ascites is seen.    Doppler US shows Acute non-occlusive thrombus present in the right posterior tibial vein  Past Medical History  Past Medical History:   Diagnosis Date    Cancer ([facility] [address]     Hypertension -Cirrhosis  -Onychomycosis    Past Surgical History  History reviewed. No pertinent surgical history. Family History  History reviewed. No pertinent family history.       Social History  Social History     Socioeconomic History    Marital status:      Spouse name: Not on file    Number of children: Not on file    Years of education: Not on file    Highest education level: Not on file   Occupational History    Not on file   Social Needs    Financial resource strain: Not on file    Food insecurity:     Worry: Not on file     Inability: Not on file    Transportation needs:     Medical: Not on file     Non-medical: Not on file   Tobacco Use    Smoking status: Current Every Day Smoker    Smokeless tobacco: Never Used   Substance and Sexual Activity    Alcohol use: No    Drug use: No    Sexual activity: Not Currently   Lifestyle    Physical activity:     Days per week: Not on file     Minutes per session: Not on file    Stress: Not on file   Relationships    Social connections:     Talks on phone: Not on file     Gets together: Not on file     Attends Shinto service: Not on file     Active member of club or organization: Not on file     Attends meetings of clubs or organizations: Not on file     Relationship status: Not on file    Intimate partner violence:     Fear of current or ex partner: Not on file     Emotionally abused: Not on file     Physically abused: Not on file     Forced sexual activity: Not on file   Other Topics Concern    Not on file   Social History Narrative    Not on file         Medications  Current Facility-Administered Medications   Medication Dose Route Frequency    sodium chloride (NS) flush 5-10 mL  5-10 mL IntraVENous PRN    piperacillin-tazobactam (ZOSYN) 4.5 g in 0.9% sodium chloride (MBP/ADV) 100 mL MBP  4.5 g IntraVENous Q8H    levoFLOXacin (LEVAQUIN) 750 mg in D5W IVPB  750 mg IntraVENous Q24H    VANCOMYCIN INFORMATION NOTE   Other Rx Dosing/Monitoring  sodium chloride (NS) flush 5-10 mL  5-10 mL IntraVENous PRN    HYDROmorphone (DILAUDID) syringe 0.2 mg  0.2 mg IntraVENous Q4H PRN    lactated Ringers infusion  150 mL/hr IntraVENous CONTINUOUS    [START ON 5/18/2019] vancomycin (VANCOCIN) 1,000 mg in 0.9% sodium chloride (MBP/ADV) 250 mL adv  1,000 mg IntraVENous Q18H    [START ON 5/20/2019] VANCOMYCIN INFORMATION NOTE   Other [facility] Problems  Date Reviewed: 10/3/2018          Codes Class Noted POA    Altered mental state ICD-10-CM: R41.82  ICD-9-CM: 780.97  5/17/2019 Unknown        * (Principal) Sepsis ([address] ICD-10-CM: A41.9  ICD-9-CM: 038.9, 995.91  5/17/2019 Unknown        Wrist pain, acute, left ICD-10-CM: M25.532  ICD-9-CM: 719.43  5/17/2019 Unknown        Dehydration ICD-10-CM: E86.0  ICD-9-CM: 276.51  5/17/2019 Unknown        Liver failure ([address] ICD-10-CM: K72.90  ICD-9-CM: 572.8  5/17/2019 Unknown        Liver cancer ([address] ICD-10-CM: C22.9  ICD-9-CM: 155.2  5/17/2019 Unknown        HTN (hypertension) ICD-10-CM: I10  ICD-9-CM: 401.9  5/17/2019 Unknown        Fall ICD-10-CM: W19. Vasquez Grief  ICD-9-CM: E888.9  5/17/2019 Unknown            No Known Allergies    Review of Systems:  Pertinent items are noted in the History of Present Illness. Objective:     Physical Exam:  Visit Vitals  /57   Pulse 85   Temp 98.2 °F (36.8 °C)   Resp 22   Ht 5' 6\" (1.676 m)   Wt 62.7 kg (138 lb 3.7 oz)   SpO2 98%   BMI 22.31 kg/m²     General appearance: chronically ill appearing M in no distress, He is alert, cooperative, and interactive with me  Head: Normocephalic, without obvious abnormality, atraumatic  Eyes: negative for pallor or icterus  Throat: Lips, mucosa, and tongue are dry; dentures on the top, no teeth or dentures on the bottom.    Neck: supple, symmetrical, trachea midline, no adenopathy, thyroid: not enlarged   Lungs: clear to auscultation bilaterally  Heart: regular rate and rhythm, S1, S2 normal, 2/6 systolic murmur at the base, no click, rub or gallop  Abdomen: soft, non-tender. Bowel sounds normal. No masses  Extremities: Holds his left arm close to the body and there is mild swelling of the left wrist. There are areas of echymoses along the lower extremities with erythema and scaling of the skin along the left lower leg, Minimal ankle edema is noted. Skin: Not jaundice  Neurologic: Awake and oriented x 3, no asterixis in the right hand; he would not raise the left hand. Recent Results (from the past 24 hour(s))   EKG, 12 LEAD, INITIAL    Collection Time: 05/17/19 12:06 PM   Result Value Ref Range    Ventricular Rate 99 BPM    Atrial Rate 99 BPM    P-R Interval 114 ms    QRS Duration 124 ms    Q-T Interval 368 ms    QTC Calculation (Bezet) 472 ms    Calculated P Axis 62 degrees    Calculated R Axis -62 degrees    Calculated T Axis 68 degrees    Diagnosis       Normal sinus rhythm  Possible Left atrial enlargement  Right bundle branch block  Left anterior fascicular block  Bifascicular block  Left ventricular hypertrophy  Possible Lateral infarct (cited on or before 04-MAR-2019)  Abnormal ECG  When compared with ECG of 04-MAR-2019 17:36,  T wave inversion no longer evident in Inferior leads  T wave inversion less evident in Lateral leads     METABOLIC PANEL, COMPREHENSIVE    Collection Time: 05/17/19 12:20 PM   Result Value Ref Range    Sodium 134 (L) 136 - 145 mmol/L    Potassium 3.8 3.5 - 5.5 mmol/L    Chloride 98 (L) 100 - 108 mmol/L    CO2 25 21 - 32 mmol/L    Anion gap 11 3.0 - 18 mmol/L    Glucose 86 74 - 99 mg/dL    BUN 21 (H) 7.0 - 18 MG/DL    Creatinine 0.95 0.6 - 1.3 MG/DL    BUN/Creatinine ratio 22 (H) 12 - 20      GFR est AA >60 >60 ml/min/1.73m2    GFR est non-AA >60 >60 ml/min/1.73m2    Calcium 8.2 (L) 8.5 - 10.1 MG/DL    Bilirubin, total 2.4 (H) 0.2 - 1.0 MG/DL    ALT (SGPT) 3,237 (H) 16 - 61 U/L    AST (SGOT) 8,740 (H) 15 - 37 U/L    Alk.  phosphatase 426 (H) 45 - 117 U/L    Protein, total 6.6 6.4 - 8.2 g/dL    Albumin 2.3 (L) 3.4 - 5.0 g/dL    Globulin 4.3 (H) 2.0 - 4.0 g/dL    A-G Ratio 0.5 (L) 0.8 - 1.7     CBC WITH AUTOMATED DIFF    Collection Time: 05/17/19 12:20 PM   Result Value Ref Range    WBC 10.2 4.6 - 13.2 K/uL    RBC 3.93 (L) 4.70 - 5.50 M/uL    HGB 9.8 (L) 13.0 - 16.0 g/dL    HCT 31.4 (L) 36.0 - 48.0 %    MCV 79.9 74.0 - 97.0 FL    MCH 24.9 24.0 - 34.0 PG    MCHC 31.2 31.0 - 37.0 g/dL    RDW 20.5 (H) 11.6 - 14.5 %    PLATELET 598 (L) 727 - 420 K/uL    MPV 8.9 (L) 9.2 - 11.8 FL    NEUTROPHILS 81 (H) 42 - 75 %    BAND NEUTROPHILS 11 (H) 0 - 5 %    LYMPHOCYTES 5 (L) 20 - 51 %    MONOCYTES 3 2 - 9 %    EOSINOPHILS 0 0 - 5 %    BASOPHILS 0 0 - 3 %    NRBC 2.0 (H) 0  WBC    ABS. NEUTROPHILS 8.3 (H) 1.8 - 8.0 K/UL    ABS. LYMPHOCYTES 0.5 (L) 0.8 - 3.5 K/UL    ABS. MONOCYTES 0.3 0 - 1.0 K/UL    ABS. EOSINOPHILS 0.0 0.0 - 0.4 K/UL    ABS.  BASOPHILS 0.0 0.0 - 0.1 K/UL    PLATELET COMMENTS CLUMPED PLATELETS      RBC COMMENTS ANISOCYTOSIS  1+        RBC COMMENTS POLYCHROMASIA  1+        DF MANUAL     CARDIAC PANEL,(CK, CKMB & TROPONIN)    Collection Time: 05/17/19 12:20 PM   Result Value Ref Range    CK 3,381 (H) 39 - 308 U/L    CK - MB 10.1 (H) <3.6 ng/ml    CK-MB Index 0.3 0.0 - 4.0 %    Troponin-I, QT 1.07 (H) 0.0 - 0.045 NG/ML   ACETAMINOPHEN    Collection Time: 05/17/19 12:20 PM   Result Value Ref Range    Acetaminophen level <2 (L) 10.0 - 30.0 ug/mL   PROTHROMBIN TIME + INR    Collection Time: 05/17/19 12:20 PM   Result Value Ref Range    Prothrombin time 17.6 (H) 11.5 - 15.2 sec    INR 1.5 (H) 0.8 - 1.2     POC LACTIC ACID    Collection Time: 05/17/19 12:28 PM   Result Value Ref Range    Lactic Acid (POC) 4.55 (HH) 0.40 - 2.00 mmol/L   URINALYSIS W/ RFLX MICROSCOPIC    Collection Time: 05/17/19 12:55 PM   Result Value Ref Range    Color DARK YELLOW      Appearance CLEAR      Specific gravity 1.019 1.005 - 1.030      pH (UA) 6.0 5.0 - 8.0      Protein 30 (A) NEG mg/dL    Glucose NEGATIVE  NEG mg/dL    Ketone NEGATIVE  NEG mg/dL    Bilirubin SMALL (A) NEG      Blood LARGE (A) NEG      Urobilinogen 1.0 0.2 - 1.0 EU/dL    Nitrites NEGATIVE  NEG      Leukocyte Esterase NEGATIVE  NEG     URINE MICROSCOPIC ONLY    Collection Time: 05/17/19 12:55 PM   Result Value Ref Range    WBC 2 to 5 0 - 4 /hpf    RBC 4 to 10 0 - 5 /hpf    Epithelial cells 2+ 0 - 5 /lpf    Bacteria 4+ (A) NEG /hpf    Mucus 1+ (A) NEG /lpf   AMMONIA    Collection Time: 05/17/19  2:15 PM   Result Value Ref Range    Ammonia 31 11 - 32 UMOL/L   POC LACTIC ACID    Collection Time: 05/17/19  3:36 PM   Result Value Ref Range    Lactic Acid (POC) 2.35 (HH) 0.40 - 2.00 mmol/L         Signed By: Daniel Sadler MD     May 17, 2019

## 2019-05-17 NOTE — PROGRESS NOTES
Physical Exam  
Constitutional: He is oriented to person, place, and time. No distress. Abdominal: He exhibits distension. Neurological: He is oriented to person, place, and time. Skin: Skin is warm and dry. Abrasion, bruising, lesion, purpura and rash noted. He is not diaphoretic. There is erythema. There is pallor.

## 2019-05-17 NOTE — CONSULTS
[facility] - Consult Note    Consultation request by Dr. Charlie Canales for advice/opinion related to evaluating elevated troponin    Date of  Admission: 5/17/2019 11:46 AM   Primary Care Physician:  Murali Zamorano MD  Patient seen and examined independently. Asked to see patient for minimally elevated troponin in the setting of rhabdomyolysis, confusion and hepatocellular carcinoma. Will order echocardiogram.  Patient denied chest pain earlier today electrocardiogram does not show evidence for significant ongoing ischemia. Agree with repeat troponin and if no significant trending peaking occurs, would not recommend any further cardiovascular evaluation. Agree with assessment and plan as noted below. Ambika Byrd MD   Assessment:     -Sent by APS due to falls and incontinence. -Hepatocellular carcinoma, CT abdomen 5/17/19: persistent cirrhotic appearance of the liver with ill-defined hypoattenuation in the right lobe which may represent pt's treated hepatocellular carcinoma. There is extensive new hypoattenuation involving the left lobe without obvious etiology. Cannot exclude mass on the vascular compromise related to portal vein thrombosis potentially cause this appearance over the left portal vein does not appear patent. There is thrombus at the bifurcation of the main portal vein with complete occlusion of the right portal vein.  -Possible new RLL pulmonary nodule with small area of RLL atelectasis or infiltrate.  -Mildly elevated troponin, no symptoms to suggest ACS per ER report  -Hypoalbuminemia     Plan:     -Suspect that patient's mild troponin elevation is related to demand ischemia. Continue to trend enzymes. Would not pursue heparin infusion from cardiac standpoint. Will defer anticoagulation to primary team given CT abdomen findings. History of Present Illness: This is a 68 y.o. male admitted for Sepsis ([facility] [address] [A41.9].     Patient complains of:  Failure to thrive    [facility] Khadra Giovani Schwartz is a 68 y.o. male with PMHx as described above. He was sent to the ER by police and APS due to falls and incontinence. According to ER note, he did not know why he was here and intermittently c/o extremity pain. He is resting soundly on our evaluation and thus provides no additional details at this time. Cardiac risk factors: male gender, hypertension      Review of Symptoms:    Review of systems not obtained due to patient factors. Past Medical History:     Past Medical History:   Diagnosis Date    Cancer ([facility] [address]     Hypertension          Social History:     Social History     Socioeconomic History    Marital status:      Spouse name: Not on file    Number of children: Not on file    Years of education: Not on file    Highest education level: Not on file   Tobacco Use    Smoking status: Current Every Day Smoker    Smokeless tobacco: Never Used   Substance and Sexual Activity    Alcohol use: No    Drug use: No    Sexual activity: Not Currently        Family History:   History reviewed. No pertinent family history. Medications:   No Known Allergies     Current Facility-Administered Medications   Medication Dose Route Frequency    sodium chloride (NS) flush 5-10 mL  5-10 mL IntraVENous PRN    piperacillin-tazobactam (ZOSYN) 4.5 g in 0.9% sodium chloride (MBP/ADV) 100 mL MBP  4.5 g IntraVENous Q8H    levoFLOXacin (LEVAQUIN) 750 mg in D5W IVPB  750 mg IntraVENous Q24H    VANCOMYCIN INFORMATION NOTE   Other Rx Dosing/Monitoring    sodium chloride (NS) flush 5-10 mL  5-10 mL IntraVENous PRN    [START ON 5/18/2019] vancomycin (VANCOCIN) 1,000 mg in 0.9% sodium chloride (MBP/ADV) 250 mL adv  1,000 mg IntraVENous Q18H    [START ON 5/20/2019] VANCOMYCIN INFORMATION NOTE   Other ONCE     Current Outpatient Medications   Medication Sig    alum-mag hydroxide-simeth (MYLANTA) 200-200-20 mg/5 mL susp Take 30 mL by mouth three (3) times daily (with meals).     lidocaine (LIDOCAINE VISCOUS) 2 % solution Take 15 mL by mouth as needed for Pain. Physical Exam:     Visit Vitals  /56   Pulse 89   Temp 98.4 °F (36.9 °C)   Resp 18   Ht 5' 6\" (1.676 m)   Wt 125 lb (56.7 kg)   SpO2 100%   BMI 20.18 kg/m²     BP Readings from Last 3 Encounters:   05/17/19 124/56   03/04/19 153/79   10/03/18 129/58     Pulse Readings from Last 3 Encounters:   05/17/19 89   03/04/19 79   10/03/18 74     Wt Readings from Last 3 Encounters:   05/17/19 125 lb (56.7 kg)   03/04/19 113 lb (51.3 kg)   10/03/18 151 lb (68.5 kg)       General:  Resting comfortably, snoring, no apparent distress, appears stated age  Neck:  No JVD  Lungs:  clear to auscultation bilaterally to anterior lung fields  Heart:  Regular rate and rhythm  Abdomen:  abdomen is soft without significant tenderness or guarding  Extremities:  Areas of ecchymosis to upper extremities, 1-2+ edema to lower extremities  Skin: Warm and dry.    Neuro: no involuntary movements  Psych: non focal     Data Review:     Recent Labs     05/17/19  1220   WBC 10.2   HGB 9.8*   HCT 31.4*   *     Recent Labs     05/17/19  1220   *   K 3.8   CL 98*   CO2 25   GLU 86   BUN 21*   CREA 0.95   CA 8.2*   ALB 2.3*   SGOT 8,740*   ALT 3,237*   INR 1.5*       Results for orders placed or performed during the hospital encounter of 05/17/19   EKG, 12 LEAD, INITIAL   Result Value Ref Range    Ventricular Rate 99 BPM    Atrial Rate 99 BPM    P-R Interval 114 ms    QRS Duration 124 ms    Q-T Interval 368 ms    QTC Calculation (Bezet) 472 ms    Calculated P Axis 62 degrees    Calculated R Axis -62 degrees    Calculated T Axis 68 degrees    Diagnosis       Normal sinus rhythm  Possible Left atrial enlargement  Right bundle branch block  Left anterior fascicular block  Bifascicular block  Left ventricular hypertrophy  Possible Lateral infarct (cited on or before 04-MAR-2019)  Abnormal ECG  When compared with ECG of 04-MAR-2019 17:36,  T wave inversion no longer evident in Inferior leads  T wave inversion less evident in Lateral leads         All Cardiac Markers in the last 24 hours:    Lab Results   Component Value Date/Time    CPK 3,381 (H) 05/17/2019 12:20 PM    CKMB 10.1 (H) 05/17/2019 12:20 PM    CKND1 0.3 05/17/2019 12:20 PM    TROIQ 1.07 (H) 05/17/2019 12:20 PM       Signed By: Bradley Chapa PA-C     May 17, 2019

## 2019-05-17 NOTE — ED TRIAGE NOTES
Per EMS, pt found at home on the floor after a fall. Pt denies hitting head or LOC but had difficulty getting up and appeared soiled. States left wrist pain. APS called per EMS. Denies use of blood thinners
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
## 2019-05-18 ENCOUNTER — APPOINTMENT (OUTPATIENT)
Dept: NON INVASIVE DIAGNOSTICS | Age: 69
DRG: 871 | End: 2019-05-18
Attending: PHYSICIAN ASSISTANT
Payer: MEDICARE

## 2019-05-18 ENCOUNTER — APPOINTMENT (OUTPATIENT)
Dept: ULTRASOUND IMAGING | Age: 69
DRG: 871 | End: 2019-05-18
Attending: INTERNAL MEDICINE
Payer: MEDICARE

## 2019-05-18 ENCOUNTER — APPOINTMENT (OUTPATIENT)
Dept: GENERAL RADIOLOGY | Age: 69
DRG: 871 | End: 2019-05-18
Attending: INTERNAL MEDICINE
Payer: MEDICARE

## 2019-05-18 LAB
ALBUMIN SERPL-MCNC: 1.8 G/DL (ref 3.4–5)
ALBUMIN SERPL-MCNC: 1.8 G/DL (ref 3.4–5)
ALBUMIN/GLOB SERPL: 0.5 {RATIO} (ref 0.8–1.7)
ALBUMIN/GLOB SERPL: 0.5 {RATIO} (ref 0.8–1.7)
ALP SERPL-CCNC: 771 U/L (ref 45–117)
ALP SERPL-CCNC: 778 U/L (ref 45–117)
ALT SERPL-CCNC: 4064 U/L (ref 16–61)
ALT SERPL-CCNC: 4178 U/L (ref 16–61)
AMPHET UR QL SCN: NEGATIVE
ANION GAP SERPL CALC-SCNC: 7 MMOL/L (ref 3–18)
AST SERPL-CCNC: 6539 U/L (ref 15–37)
AST SERPL-CCNC: 8575 U/L (ref 15–37)
ATRIAL RATE: 99 BPM
BARBITURATES UR QL SCN: NEGATIVE
BASOPHILS # BLD: 0 K/UL (ref 0–0.1)
BASOPHILS NFR BLD: 0 % (ref 0–3)
BENZODIAZ UR QL: NEGATIVE
BILIRUB DIRECT SERPL-MCNC: 0.7 MG/DL (ref 0–0.2)
BILIRUB SERPL-MCNC: 1.7 MG/DL (ref 0.2–1)
BILIRUB SERPL-MCNC: 2 MG/DL (ref 0.2–1)
BUN SERPL-MCNC: 16 MG/DL (ref 7–18)
BUN/CREAT SERPL: 36 (ref 12–20)
CALCIUM SERPL-MCNC: 7.6 MG/DL (ref 8.5–10.1)
CALCULATED P AXIS, ECG09: 62 DEGREES
CALCULATED R AXIS, ECG10: -62 DEGREES
CALCULATED T AXIS, ECG11: 68 DEGREES
CANNABINOIDS UR QL SCN: NEGATIVE
CHLORIDE SERPL-SCNC: 108 MMOL/L (ref 100–108)
CK SERPL-CCNC: 1842 U/L (ref 39–308)
CO2 SERPL-SCNC: 26 MMOL/L (ref 21–32)
COCAINE UR QL SCN: NEGATIVE
CREAT SERPL-MCNC: 0.44 MG/DL (ref 0.6–1.3)
DIAGNOSIS, 93000: NORMAL
DIFFERENTIAL METHOD BLD: ABNORMAL
EOSINOPHIL # BLD: 0 K/UL (ref 0–0.4)
EOSINOPHIL NFR BLD: 0 % (ref 0–5)
ERYTHROCYTE [DISTWIDTH] IN BLOOD BY AUTOMATED COUNT: 21 % (ref 11.6–14.5)
GLOBULIN SER CALC-MCNC: 3.4 G/DL (ref 2–4)
GLOBULIN SER CALC-MCNC: 3.4 G/DL (ref 2–4)
GLUCOSE BLD STRIP.AUTO-MCNC: 83 MG/DL (ref 70–110)
GLUCOSE SERPL-MCNC: 64 MG/DL (ref 74–99)
HCT VFR BLD AUTO: 28.6 % (ref 36–48)
HDSCOM,HDSCOM: ABNORMAL
HGB BLD-MCNC: 9 G/DL (ref 13–16)
INR PPP: 1.7 (ref 0.8–1.2)
LACTATE SERPL-SCNC: 3.1 MMOL/L (ref 0.4–2)
LACTATE SERPL-SCNC: 3.9 MMOL/L (ref 0.4–2)
LIPASE SERPL-CCNC: 163 U/L (ref 73–393)
LYMPHOCYTES # BLD: 0.3 K/UL (ref 0.8–3.5)
LYMPHOCYTES NFR BLD: 5 % (ref 20–51)
MAGNESIUM SERPL-MCNC: 2 MG/DL (ref 1.6–2.6)
MCH RBC QN AUTO: 25.5 PG (ref 24–34)
MCHC RBC AUTO-ENTMCNC: 31.5 G/DL (ref 31–37)
MCV RBC AUTO: 81 FL (ref 74–97)
METHADONE UR QL: NEGATIVE
MONOCYTES # BLD: 0.2 K/UL (ref 0–1)
MONOCYTES NFR BLD: 3 % (ref 2–9)
NEUTS SEG # BLD: 5.3 K/UL (ref 1.8–8)
NEUTS SEG NFR BLD: 92 % (ref 42–75)
OPIATES UR QL: POSITIVE
P-R INTERVAL, ECG05: 114 MS
PCP UR QL: NEGATIVE
PHOSPHATE SERPL-MCNC: 3.2 MG/DL (ref 2.5–4.9)
PLATELET # BLD AUTO: 79 K/UL (ref 135–420)
PLATELET COMMENTS,PCOM: ABNORMAL
PMV BLD AUTO: 9.8 FL (ref 9.2–11.8)
POTASSIUM SERPL-SCNC: 3.5 MMOL/L (ref 3.5–5.5)
PROT SERPL-MCNC: 5.2 G/DL (ref 6.4–8.2)
PROT SERPL-MCNC: 5.2 G/DL (ref 6.4–8.2)
PROTHROMBIN TIME: 19.8 SEC (ref 11.5–15.2)
Q-T INTERVAL, ECG07: 368 MS
QRS DURATION, ECG06: 124 MS
QTC CALCULATION (BEZET), ECG08: 472 MS
RBC # BLD AUTO: 3.53 M/UL (ref 4.7–5.5)
RBC MORPH BLD: ABNORMAL
SODIUM SERPL-SCNC: 141 MMOL/L (ref 136–145)
TROPONIN I SERPL-MCNC: 0.48 NG/ML (ref 0–0.04)
TROPONIN I SERPL-MCNC: 0.82 NG/ML (ref 0–0.04)
VENTRICULAR RATE, ECG03: 99 BPM
WBC # BLD AUTO: 5.8 K/UL (ref 4.6–13.2)

## 2019-05-18 PROCEDURE — 83605 ASSAY OF LACTIC ACID: CPT

## 2019-05-18 PROCEDURE — 74011250636 HC RX REV CODE- 250/636: Performed by: INTERNAL MEDICINE

## 2019-05-18 PROCEDURE — 74011250636 HC RX REV CODE- 250/636: Performed by: HOSPITALIST

## 2019-05-18 PROCEDURE — 65660000001 HC RM ICU INTERMED STEPDOWN

## 2019-05-18 PROCEDURE — 84100 ASSAY OF PHOSPHORUS: CPT

## 2019-05-18 PROCEDURE — 83690 ASSAY OF LIPASE: CPT

## 2019-05-18 PROCEDURE — 74011250637 HC RX REV CODE- 250/637: Performed by: INTERNAL MEDICINE

## 2019-05-18 PROCEDURE — 0R9N3ZX DRAINAGE OF RIGHT WRIST JOINT, PERCUTANEOUS APPROACH, DIAGNOSTIC: ICD-10-PCS | Performed by: RADIOLOGY

## 2019-05-18 PROCEDURE — 76705 ECHO EXAM OF ABDOMEN: CPT

## 2019-05-18 PROCEDURE — 92610 EVALUATE SWALLOWING FUNCTION: CPT

## 2019-05-18 PROCEDURE — 36415 COLL VENOUS BLD VENIPUNCTURE: CPT

## 2019-05-18 PROCEDURE — 85610 PROTHROMBIN TIME: CPT

## 2019-05-18 PROCEDURE — 20605 DRAIN/INJ JOINT/BURSA W/O US: CPT

## 2019-05-18 PROCEDURE — 85025 COMPLETE CBC W/AUTO DIFF WBC: CPT

## 2019-05-18 PROCEDURE — 84484 ASSAY OF TROPONIN QUANT: CPT

## 2019-05-18 PROCEDURE — 83735 ASSAY OF MAGNESIUM: CPT

## 2019-05-18 PROCEDURE — 82962 GLUCOSE BLOOD TEST: CPT

## 2019-05-18 PROCEDURE — 77002 NEEDLE LOCALIZATION BY XRAY: CPT

## 2019-05-18 PROCEDURE — 80053 COMPREHEN METABOLIC PANEL: CPT

## 2019-05-18 PROCEDURE — 82550 ASSAY OF CK (CPK): CPT

## 2019-05-18 PROCEDURE — 80076 HEPATIC FUNCTION PANEL: CPT

## 2019-05-18 PROCEDURE — 77010033678 HC OXYGEN DAILY

## 2019-05-18 RX ORDER — IBUPROFEN 200 MG
1 TABLET ORAL DAILY
Status: DISCONTINUED | OUTPATIENT
Start: 2019-05-18 | End: 2019-05-22 | Stop reason: HOSPADM

## 2019-05-18 RX ORDER — MAGNESIUM SULFATE 100 %
4 CRYSTALS MISCELLANEOUS AS NEEDED
Status: DISCONTINUED | OUTPATIENT
Start: 2019-05-18 | End: 2019-05-21

## 2019-05-18 RX ORDER — DEXTROSE MONOHYDRATE 25 G/50ML
25-50 INJECTION, SOLUTION INTRAVENOUS AS NEEDED
Status: DISCONTINUED | OUTPATIENT
Start: 2019-05-18 | End: 2019-05-21 | Stop reason: SDUPTHER

## 2019-05-18 RX ORDER — KETOROLAC TROMETHAMINE 30 MG/ML
30 INJECTION, SOLUTION INTRAMUSCULAR; INTRAVENOUS
Status: COMPLETED | OUTPATIENT
Start: 2019-05-18 | End: 2019-05-18

## 2019-05-18 RX ORDER — IBUPROFEN 200 MG
1 TABLET ORAL DAILY
Status: DISCONTINUED | OUTPATIENT
Start: 2019-05-19 | End: 2019-05-18

## 2019-05-18 RX ORDER — HYDROMORPHONE HYDROCHLORIDE 1 MG/ML
0.5 INJECTION, SOLUTION INTRAMUSCULAR; INTRAVENOUS; SUBCUTANEOUS
Status: DISCONTINUED | OUTPATIENT
Start: 2019-05-18 | End: 2019-05-22 | Stop reason: HOSPADM

## 2019-05-18 RX ADMIN — HYDROMORPHONE HYDROCHLORIDE 0.2 MG: 1 INJECTION, SOLUTION INTRAMUSCULAR; INTRAVENOUS; SUBCUTANEOUS at 07:38

## 2019-05-18 RX ADMIN — HYDROMORPHONE HYDROCHLORIDE 0.2 MG: 1 INJECTION, SOLUTION INTRAMUSCULAR; INTRAVENOUS; SUBCUTANEOUS at 11:44

## 2019-05-18 RX ADMIN — SODIUM CHLORIDE 1000 MG: 900 INJECTION, SOLUTION INTRAVENOUS at 07:00

## 2019-05-18 RX ADMIN — SODIUM CHLORIDE, SODIUM LACTATE, POTASSIUM CHLORIDE, AND CALCIUM CHLORIDE 150 ML/HR: 600; 310; 30; 20 INJECTION, SOLUTION INTRAVENOUS at 00:06

## 2019-05-18 RX ADMIN — HYDROMORPHONE HYDROCHLORIDE 0.5 MG: 1 INJECTION, SOLUTION INTRAMUSCULAR; INTRAVENOUS; SUBCUTANEOUS at 20:24

## 2019-05-18 RX ADMIN — HYDROMORPHONE HYDROCHLORIDE 0.2 MG: 1 INJECTION, SOLUTION INTRAMUSCULAR; INTRAVENOUS; SUBCUTANEOUS at 03:06

## 2019-05-18 RX ADMIN — SODIUM CHLORIDE, SODIUM LACTATE, POTASSIUM CHLORIDE, AND CALCIUM CHLORIDE 150 ML/HR: 600; 310; 30; 20 INJECTION, SOLUTION INTRAVENOUS at 06:58

## 2019-05-18 RX ADMIN — KETOROLAC TROMETHAMINE 30 MG: 30 INJECTION, SOLUTION INTRAMUSCULAR at 13:04

## 2019-05-18 RX ADMIN — SODIUM CHLORIDE 1000 MG: 900 INJECTION, SOLUTION INTRAVENOUS at 17:59
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
## 2019-05-18 NOTE — PROGRESS NOTES
Physical Exam  
Constitutional: He is oriented to person, place, and time. No distress. Abdominal: He exhibits distension. Neurological: He is oriented to person, place, and time. Skin: Skin is warm and dry. Abrasion, bruising, lesion, purpura and rash noted. He is not diaphoretic. There is erythema. There is pallor.   
 
  
Skin check with REDD Jarvis

## 2019-05-18 NOTE — PROCEDURES
Radiology Brief Procedure Note    Interventional Radiologist: Deangelo    Pre-operative Diagnosis:  Left wrist pain, evaluate for septic arthritis    Post-operative Diagnosis: Same as pre-op dx    Procedure(s) Performed:  Fluroscopically guided left wrist aspiration    Anesthesia:  Lidocaine 1%    Findings: Needle advanced under fluoroscopic control into the radiocarpal articulation, first in the DRUJ, then adjacent to the ulnar styloid recess, and then finally over the radiocarpal articulation. Aspiration was attempted in each of these locations yielding no joint fluid. Intraarticular position confirmed with dilute iodinated contrast    Complications: None    Estimated Blood Loss:  minimal    Tubes and Drains: None    Specimens: None    Condition: Good    Disposition:   To inpatient care division    Plan: Follow up per orthopedics recoommendations      Kevin Johnson MD  [facility]    5/18/2019

## 2019-05-18 NOTE — PROGRESS NOTES
Internal Medicine Progress Note NAME: Kika Courser :  1950 MRM:  [id number] Date/Time: 2019 ASSESSMENT/PLAN: 
 
 
# Admitted with  Sepsis ([facility] (2019). Unclear source. Possible septic wrist. Sepsis protocol started in er. Follow cultures. Ortho and ID consulted. per ID fever due to other illness than sepsis and Abx stopped. - : blood GPC, iv vanco added.  
  
 #  Altered mental state (2019). Unclear etio. Likely toxic -  metabolic encephalopathy. CT head, ammonia level negative. Neuro-observation.  
- improved mentation. He is grumpy person now  
  
 # Wrist pain, acute, left (2019). swelling and tender. Ortho consulted. Plan for aspiration 
  
#  Dehydration (2019). Aggressive IVF 
  
# Rhabdo. Elevated CPK. IVF . Improving with hydration. Follow  
  
 # Ischemic Hepatitis presented with Liver failure, ho liver cirrhoses follow with hepatologist . GI consulted. Hydration. Follow labs. Check PT/INR.  
  
# Coagulopathy due to liver disease. # Liver cancer ([facility] (2019). Will check AFP. GI on board 
  
#  HTN (hypertension) (2019). Control BP 
  
#  Falls. PT/OT when stable 
  
# TCP. # Musculoskeletal pains from fals. Pain control #  Acute non-occlusive thrombus present in 1 of 2 right posterior tibial vein(s). His INR elevated.  
  
 
 
 
Lab Review:  
 
Recent Labs 19 
0220 19 
1220 WBC 5.8 10.2 HGB 9.0* 9.8* HCT 28.6* 31.4*  
PLT 79* 133* Recent Labs 19 
02219 
1220  134* K 3.5 3.8  98* CO2 26 25 GLU 64* 86 BUN 16 21* CREA 0.44* 0.95  
CA 7.6* 8.2* MG 2.0  --   
PHOS 3.2  --   
ALB 1.8* 2.3* TBILI 2.0* 2.4* SGOT 8,575* 8,740* ALT 4,178* 3,237* INR 1.7* 1.5* Lab Results Component Value Date/Time Glucose (POC) 83 2019 08:49 AM  
 
No results for input(s): PH, PCO2, PO2, HCO3, FIO2 in the last 72 hours. Recent Labs 19 1898 05/17/19 
1220 INR 1.7* 1.5* No results found for: SDES Lab Results Component Value Date/Time Culture result:  05/17/2019 12:35 PM  
  CULTURE IN PROGRESS,FURTHER UPDATES TO FOLLOW [facility] ID/Susceptibility if indicated Culture result: (A) 05/17/2019 12:20 PM  
  AEROBIC AND ANAEROBIC BOTTLES STREPTOCOCCI, BETA HEMOLYTIC GROUP B Culture result: NO GROWTH 6 DAYS 03/28/2018 08:00 PM  
 
 
All Cardiac Markers in the last 24 hours:  
Lab Results Component Value Date/Time CPK 1,842 (H) 05/18/2019 02:20 AM  
 TROIQ 0.48 (H) 05/18/2019 08:55 AM  
 TROIQ 0.82 (H) 05/18/2019 02:20 AM  
 TROIQ 1.40 (H) 05/17/2019 07:14 PM  
 
ABG: No results found for: PH, PHI, PCO2, PCO2I, PO2, PO2I, HCO3, HCO3I, FIO2, FIO2I Liver Panel:  
Lab Results Component Value Date/Time ALB 1.8 (L) 05/18/2019 02:20 AM  
 TP 5.2 (L) 05/18/2019 02:20 AM  
 GLOB 3.4 05/18/2019 02:20 AM  
 AGRAT 0.5 (L) 05/18/2019 02:20 AM  
 SGOT 8,575 (H) 05/18/2019 02:20 AM  
 ALT 4,178 (H) 05/18/2019 02:20 AM  
  (H) 05/18/2019 02:20 AM  
 
 
 
  
Subjective: Chief Complaint:     
 
Pains in neck and wrist. Grumpy as not eating until swallow eval he failed bed side drinking ROS: 
(bold if positive,otherwise negative) Fever/chills ,  Dysuria Cough , Sputum , SOB/JOHNS , Chest Pain Diarrhea ,Nausea/Vomit , Abd Pain , Constipation Objective:  
 
Vitals: 
Last 24hrs VS reviewed since prior progress note. Most recent are: 
 
Visit Vitals /87 Pulse (!) 127 Temp 98.5 °F (36.9 °C) Resp 23 Ht 5' 6\" (1.676 m) Wt 62.7 kg (138 lb 3.7 oz) SpO2 100% BMI 22.31 kg/m² SpO2 Readings from Last 6 Encounters:  
05/18/19 100% 03/04/19 100% 10/03/18 96% 03/28/18 95% O2 Flow Rate (L/min): 3 l/min Intake/Output Summary (Last 24 hours) at 5/18/2019 1240 Last data filed at 5/18/2019 0600 Gross per 24 hour Intake 4002.5 ml Output 875 ml Net 3127.5 ml  
  
 
 
 Physical Exam:  
Gen: look better today,   Appear stated age, Well-developed,   in no acute distress HEENT:  Head atraumatic, normocephalic , hearing intact to voice, dry mucous membranes. Neck:  Supple, no masses I appreciate, thyroid non-tender. Resp:  No accessory muscle use,Bilateral BS present, clear breath sounds without wheezes rales or rhonchi 
Card:  No murmurs, normal S1, S2 without thrills, bruits. +2-3  Leg peripheral edema. Abd:  Soft, non-tender, non-distended, normoactive bowel sounds are present, no palpable organomegaly Musc: L wrist swelling tender,  No cyanosis or clubbing. Skin: multiple skin abrasions and small ulcers indicate falls,  No rashes   skin turgor is good. Neuro: Alert, no co operative, grumpy, yelling, seems frustrated. Cranial nerves are grossly intact, no clear area of focal motor weakness, Does not follows commands appropriately. With out prompting repeatedly Telemetry reviewed:   normal sinus rhythm Medications Reviewed: (see below) Lab Data Reviewed: (see below) 
 
______________________________________________________________________ Medications:  
 
Current Facility-Administered Medications Medication Dose Route Frequency  glucose chewable tablet 16 g  4 Tab Oral PRN  
 glucagon (GLUCAGEN) injection 1 mg  1 mg IntraMUSCular PRN  
 dextrose (D50) infusion 12.5-25 g  25-50 mL IntraVENous PRN  
 sodium chloride (NS) flush 5-10 mL  5-10 mL IntraVENous PRN  
 sodium chloride (NS) flush 5-10 mL  5-10 mL IntraVENous PRN  
 HYDROmorphone (DILAUDID) syringe 0.2 mg  0.2 mg IntraVENous Q4H PRN  
 lactated Ringers infusion  150 mL/hr IntraVENous CONTINUOUS  
 vancomycin (VANCOCIN) 1,000 mg in 0.9% sodium chloride (MBP/ADV) 250 mL adv  1,000 mg IntraVENous Q18H  
 VANCOMYCIN INFORMATION NOTE   Other Rx Dosing/Monitoring  [START ON 5/20/2019] VANCOMYCIN INFORMATION NOTE   Other ONCE Total time spent with patient: 35 minutes Care Plan discussed with: Patient, Nursing Staff, Consultant/Specialist and >50% of time spent in counseling and coordination of care id, RADIOLOGY TECH. Discussed:  Care Plan Prophylaxis:  SCD's Disposition:  TD Attending Physician: Jerrica Nick MD

## 2019-05-18 NOTE — PROGRESS NOTES
Gastrointestinal Progress Note Patient Name: Avtar Lyon Today's Date: 5/18/2019 Admit Date: 5/17/2019 Assessment:  
1) Acute liver injury with AST>ALT 8740/3237; AST slightly down suggesting and ALT slightly up and would expect the numbers to flip and start falling significantly in the next 24-48 hours. Suspect ischemic hepatitis 2) Liver cancer, s/p prior treatments over the past several years, currently off therapy with progression of disease suggested by recent CT scan 3) Cirrhosis, presumably due to hepatitis C treated in the past 
4) Recent falls, details unclear; ?period of time down at home 5) Recent weakness 6) Right posterior tibial vein thrombosis 7) Elevated troponin without chest pain Recommendation:  
-Await acute hepatitis pane, AFP 
-Awaiting doppler US of hepatic veins and artery; will also add RUQ US to ensure CBD nondilated 
-Monitor liver enzymes, INR, and mental status -Avoid hypotension 
-No limitations to diet from my perspective once ultrasounds are completed 
-Will follow Subjective:  
 
Avtar Lyon is a 68 y.o. Male followed for acute liver injury in the setting of cirrhosis and liver cancer. Has head, neck, wrist pains. More tachycardic this AM. No vomiting. Some mild RUQ tenderness. Current Facility-Administered Medications Medication Dose Route Frequency  glucose chewable tablet 16 g  4 Tab Oral PRN  
 glucagon (GLUCAGEN) injection 1 mg  1 mg IntraMUSCular PRN  
 dextrose (D50) infusion 12.5-25 g  25-50 mL IntraVENous PRN  
 sodium chloride (NS) flush 5-10 mL  5-10 mL IntraVENous PRN  
 sodium chloride (NS) flush 5-10 mL  5-10 mL IntraVENous PRN  
 HYDROmorphone (DILAUDID) syringe 0.2 mg  0.2 mg IntraVENous Q4H PRN  
 lactated Ringers infusion  150 mL/hr IntraVENous CONTINUOUS  
 vancomycin (VANCOCIN) 1,000 mg in 0.9% sodium chloride (MBP/ADV) 250 mL adv  1,000 mg IntraVENous Q18H  VANCOMYCIN INFORMATION NOTE   Other Rx Dosing/Monitoring  [START ON 5/20/2019] VANCOMYCIN INFORMATION NOTE   Other ONCE  
  
 
 
Objective:  
 
Physical Exam: 
 
Visit Vitals /79 Pulse (!) 109 Temp 98.5 °F (36.9 °C) Resp 25 Ht 5' 6\" (1.676 m) Wt 62.7 kg (138 lb 3.7 oz) SpO2 99% BMI 22.31 kg/m² Gen: Awake and alert CV: Regular, tachycardic Abd: Soft, mild RUQ tenderness without rebound/guarding. Data Review: 
 
Labs: Results:  
   
Chemistry Recent Labs 05/18/19 0220 05/17/19 
1220 GLU 64* 86  134* K 3.5 3.8  98* CO2 26 25 BUN 16 21* CREA 0.44* 0.95  
CA 7.6* 8.2* AGAP 7 11 BUCR 36* 22* * 426* TP 5.2* 6.6 ALB 1.8* 2.3*  
GLOB 3.4 4.3* AGRAT 0.5* 0.5* CBC w/Diff Recent Labs 05/18/19 0220 05/17/19 
1220 WBC 5.8 10.2 RBC 3.53* 3.93* HGB 9.0* 9.8* HCT 28.6* 31.4*  
PLT 79* 133* GRANS 92* 81* LYMPH 5* 5* EOS 0 0 Coagulation Recent Labs 05/18/19 0220 05/17/19 
1220 PTP 19.8* 17.6* INR 1.7* 1.5* Liver Enzymes Recent Labs 05/18/19 0220 TP 5.2* ALB 1.8* * SGOT 8,575* ALT 4,178* Rylee Strickland MD 
May 18, 2019

## 2019-05-18 NOTE — PROGRESS NOTES
Echocardiogram attempted at the bedside. Patient having other procedure at this time. Will attempt again morning of 5/19/19. Cheryl Goncalves RDCS [facility] Echo Lab Lab Extension: [phone] Pager: [phone]

## 2019-05-18 NOTE — PROGRESS NOTES
0700: Bedside shift change report given to [address] [place] [address] 
 (oncoming nurse) by Shelby Ward (offgoing nurse). Report included the following information SBAR, MAR and Recent Results. 1500: Pt and this RN went down to XR for procedure. 1600: returned from procedure. VSS. 
 
1610: Gave patient bath, new sheets, new gown, new linen. 1700: Pt's family has arrived from [place]. Copied all legal documents (living will, power of ) and placed into the patient's chart. 5: Daughter in law states that the patient smokes 1 PPD x30+ years. Notified doctor, and Nicotine patch ordered per Dr. Nandini Obando. 1900: Bedside shift change report given to Shelby Ward RN (oncoming nurse) by Ky Goodpasture, RN (offgoing nurse). Report included the following information SBAR, MAR and Recent Results.

## 2019-05-18 NOTE — PROGRESS NOTES
conducted an initial consultation and Spiritual Assessment for Lolly Hightower, who is a 68 y.o.,male. Patients Primary Language is: [place]. According to the patients EMR Bahai Affiliation is: No preference. The reason the Patient came to the hospital is:  
Patient Active Problem List  
 Diagnosis Date Noted  Altered mental state 05/17/2019  [facility] 05/17/2019  Wrist pain, acute, left 05/17/2019  Dehydration 05/17/2019  Liver failure ([facility] 05/17/2019  Liver cancer ([facility] 05/17/2019  
 HTN (hypertension) 05/17/2019  Fall 05/17/2019 The  provided the following Interventions: 
Initiated a relationship of care and support. Provided information about Spiritual Care Services. Offered prayer and assurance of continued prayers on patient's behalf. The following outcomes were achieved: 
Patient expressed gratitude for 's visit. Assessment: 
There are no further spiritual or Hinduism issues which require intervention at this time. Plan: 
Chaplains will continue to follow and will provide pastoral care on an as needed/requested basis. Velvet Villasenor M.Div. , [address] [address] [facility]: [phone]/OZJ: [phone]

## 2019-05-18 NOTE — PROGRESS NOTES
Problem: Dysphagia (Adult) Goal: *Acute Goals and Plan of Care (Insert Text) Description Patient will: 1. Tolerate puree diet with nectar thick liquids without overt s/sx of aspiration in 4/5 trials under SLP supervision. 2. Tolerate diet upgrade without overt s/sx of aspiration under SLP supervision. 3. Utilize compensatory swallow strategies of small bite/sip, alternate liquid/solid with min cues in 4/5 trials. 4. Complete an objective swallow study (i.e., MBSS) to assess swallow integrity, r/o aspiration, and determine of safest LRD, min A. 
 
 
Rec:  
puree diet, nectar thick liquids Aspiration precautions HOB >45 during po intake, remain >30 for 30-45 minutes after po Small bites/sips; alternate liquid/solid, slow feeding rate Oral care TID Meds per pt preference (puree v. Riva thick liquids) MBSS to be completed next business date Outcome: Progressing Towards Goal 
  
SPEECH LANGUAGE PATHOLOGY BEDSIDE SWALLOW EVALUATION Patient: Kodi Tierney (39 y.o. male) Date: 5/18/2019 Primary Diagnosis: Sepsis ([facility] [address] [A41.9] Precautions: Aspiration ASSESSMENT : 
Based on the objective data described below, the patient presents with mild oropharyngeal dysphagia. Pt seen for swallow eval. Pt AOx4, accepting of eval, agitated d/t RN placing IV during eval. Pt denies difficulty swallowing, reporting \"I only like yogurt and applesauce\". Oral motor examination revealed endentulous lower dentition; however, remaining oral structures, strength, and ROM WFL; grossly intact for mastication and deglutition. Pt given PO trials of thin and nectar thick liquid via cup/straw, and puree textures. Pt with delayed weak cough following trials of thin liquid +/- straw. Pt noted to cough up phlegm during instances of coughing. Nectar thick liquid and puree texture taken [facility]. Pt refusing trials of solids, stating he typically only eats yogurt and applesauce.  Adequate laryngeal elevation noted via palpation. Recommend puree texture diet with nectar thick liquids. MBSS to be completed next business date. Pt educated on and verbalized understanding of findings, recs, and POC; d/w RN. SLP will FU per POC. Patient will benefit from skilled intervention to address the above impairments. Patient's rehabilitation potential is considered to be Good Factors which may influence rehabilitation potential include: ? None noted ? Mental ability/status ? Medical condition ? Home/family situation and support systems ? Safety awareness ? Pain tolerance/management ? Other: PLAN : 
Recommendations and Planned Interventions: 
See above. Frequency/Duration: Patient will be followed by speech-language pathology 1-2 times per day/3-5 days per week to address goals. Discharge Recommendations: To Be Determined SUBJECTIVE:  
Patient stated ? This tastes so good. I will never take applesauce for granted again? . 
 
OBJECTIVE:  
 
Past Medical History:  
Diagnosis Date Cancer ([facility] [address] Hypertension History reviewed. No pertinent surgical history. Home Situation:  
Home Situation Home Environment: Private residence One/Two Story Residence: One story Living Alone: No 
Support Systems: Family member(s), Friends \ neighbors Patient Expects to be Discharged to[de-identified] Private residence Current DME Used/Available at Home: None Diet prior to admission: unknown Current Diet:  puree/nectar thick Cognitive and Communication Status: 
Neurologic State: Alert, Agitated Orientation Level: Oriented X4 Cognition: Follows commands Perception: Appears intact Perseveration: No perseveration noted Safety/Judgement: Fall prevention Oral Assessment: 
Oral Assessment Labial: No impairment Dentition: Natural;Edentulous; Other (comment)(endentulous bottom teeth) Oral Hygiene: fair Lingual: No impairment Velum: No impairment Mandible: No impairment P.O. Trials: 
Patient Position: 65* HOB Vocal quality prior to P.O.: No impairment Consistency Presented: Thin liquid; Nectar thick liquid;Puree How Presented: SLP-fed/presented;Straw;Cup/sip; Successive swallows;Spoon Bolus Acceptance: No impairment Bolus Formation/Control: No impairment Propulsion: No impairment Oral Residue: None Initiation of Swallow: Delayed (# of seconds) Laryngeal Elevation: Functional 
Aspiration Signs/Symptoms: Clear throat;Weak cough Pharyngeal Phase Characteristics: Altered vocal quality;Multiple swallows Effective Modifications: Small sips and bites Cues for Modifications: Moderate Oral Phase Severity: No impairment Pharyngeal Phase Severity : Mild PAIN: 
Pain level pre-treatment: 0/10 Pain level post-treatment: 0/10 Pain Intervention(s): NA  
Response to intervention: NA After treatment:  
?            Patient left in no apparent distress sitting up in chair ? Patient left in no apparent distress in bed ? Call bell left within reach ? Nursing notified ? Family present ? Caregiver present ? Bed alarm activated COMMUNICATION/EDUCATION:  
?            Aspiration precautions; swallow safety; compensatory techniques. ?            Patient/family have participated as able in goal setting and plan of care. ?            Patient/family agree to work toward stated goals and plan of care. ?            Patient understands intent and goals of therapy; neutral about participation. ? Patient unable to participate in goal setting/plan of care; educ ongoing with interdisciplinary staff ? Posted safety precautions in patient's room. Thank you for this referral. 
 
Dionicio Dave M.S., CCC-SLP Speech-Language Pathologist 
 
Time Calculation: 10 mins

## 2019-05-18 NOTE — PROGRESS NOTES
Problem: Pressure Injury - Risk of 
Goal: *Prevention of pressure injury Description Document Moustapha Scale and appropriate interventions in the flowsheet. Outcome: Progressing Towards Goal 
  
Problem: Patient Education: Go to Patient Education Activity Goal: Patient/Family Education Outcome: Progressing Towards Goal 
  
Problem: General Medical Care Plan Goal: *Vital signs within specified parameters Outcome: Progressing Towards Goal 
Goal: *Labs within defined limits Outcome: Progressing Towards Goal 
Goal: *Absence of infection signs and symptoms Outcome: Progressing Towards Goal 
Goal: *Optimal pain control at patient's stated goal 
Outcome: Progressing Towards Goal 
Goal: *Skin integrity maintained Outcome: Progressing Towards Goal 
Goal: *Fluid volume balance Outcome: Progressing Towards Goal 
Goal: *Optimize nutritional status Outcome: Progressing Towards Goal 
Goal: *Anxiety reduced or absent Outcome: Progressing Towards Goal 
Goal: *Progressive mobility and function (eg: ADL's) Outcome: Progressing Towards Goal 
  
Problem: Pain Goal: *Control of Pain Outcome: Progressing Towards Goal 
Goal: *PALLIATIVE CARE:  Alleviation of Pain Outcome: Progressing Towards Goal 
  
Problem: Infection - Risk of, Urinary Catheter-Associated Urinary Tract Infection Goal: *Absence of infection signs and symptoms Outcome: Progressing Towards Goal 
  
Problem: Sepsis: Day of Diagnosis Goal: Off Pathway (Use only if patient is Off Pathway) Outcome: Progressing Towards Goal 
Goal: *Fluid resuscitation Outcome: Progressing Towards Goal 
Goal: *Paired blood cultures prior to first dose of antibiotic Outcome: Progressing Towards Goal 
Goal: *First dose of  appropriate antibiotic within 3 hours of arrival to ED, within 1 hour of arrival to ICU Outcome: Progressing Towards Goal 
Goal: *Lactic acid with first set of blood cultures Outcome: Progressing Towards Goal 
 Goal: *Pneumococcal immunization (if eligible) Outcome: Progressing Towards Goal 
Goal: *Influenza immunization (if eligible) Outcome: Progressing Towards Goal 
Goal: Activity/Safety Outcome: Progressing Towards Goal 
Goal: Consults, if ordered Outcome: Progressing Towards Goal 
Goal: Diagnostic Test/Procedures Outcome: Progressing Towards Goal 
Goal: Nutrition/Diet Outcome: Progressing Towards Goal 
Goal: Discharge Planning Outcome: Progressing Towards Goal 
Goal: Medications Outcome: Progressing Towards Goal 
Goal: Respiratory Outcome: Progressing Towards Goal 
Goal: Treatments/Interventions/Procedures Outcome: Progressing Towards Goal 
Goal: Psychosocial 
Outcome: Progressing Towards Goal
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
## 2019-05-18 NOTE — PROGRESS NOTES
Shift Summary Pt is a 75yo male here with BLE cellulitis and sepsis. Pt received drowsy, oriented x4, on room air, LUE in a soft splint r/t wrist displacement, fernandez, VSS, in no acute distress. VS monitored, Q2H turns, urine out put monitored via fernandez, PRN 0.2mg IV dilaudid for generalized pain, IV abx dont, labs drawn and monitored as ordered, called Dr. Lydia Bautista r/t [address] growing in blood cultx. LUE and BLE monitored closely. No acute events over night. VSS. Cont plan of care.

## 2019-05-18 NOTE — PROGRESS NOTES
Giovana Roca Ashing Wrist aspiration negative for fluid. IR's assistance on a Saturday greatly appreciated. No clinical evidence of septic arthritis in left wrist.  Recommend elevation. Will follow.  
 
Srikanth Munoz MD
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
## 2019-05-19 ENCOUNTER — APPOINTMENT (OUTPATIENT)
Dept: NON INVASIVE DIAGNOSTICS | Age: 69
DRG: 871 | End: 2019-05-19
Attending: INTERNAL MEDICINE
Payer: MEDICARE

## 2019-05-19 LAB
ALBUMIN SERPL-MCNC: 1.7 G/DL (ref 3.4–5)
ALBUMIN/GLOB SERPL: 0.5 {RATIO} (ref 0.8–1.7)
ALP SERPL-CCNC: 715 U/L (ref 45–117)
ALT SERPL-CCNC: 3140 U/L (ref 16–61)
ANION GAP SERPL CALC-SCNC: 11 MMOL/L (ref 3–18)
AST SERPL-CCNC: 3305 U/L (ref 15–37)
BACTERIA SPEC CULT: ABNORMAL
BACTERIA SPEC CULT: ABNORMAL
BASOPHILS # BLD: 0 K/UL (ref 0–0.1)
BASOPHILS NFR BLD: 0 % (ref 0–2)
BILIRUB DIRECT SERPL-MCNC: 0.8 MG/DL (ref 0–0.2)
BILIRUB SERPL-MCNC: 1.5 MG/DL (ref 0.2–1)
BUN SERPL-MCNC: 22 MG/DL (ref 7–18)
BUN/CREAT SERPL: 31 (ref 12–20)
CALCIUM SERPL-MCNC: 8 MG/DL (ref 8.5–10.1)
CHLORIDE SERPL-SCNC: 109 MMOL/L (ref 100–108)
CK MB CFR SERPL CALC: 0.9 % (ref 0–4)
CK MB SERPL-MCNC: 4.2 NG/ML (ref 5–25)
CK SERPL-CCNC: 484 U/L (ref 39–308)
CK SERPL-CCNC: 551 U/L (ref 39–308)
CO2 SERPL-SCNC: 22 MMOL/L (ref 21–32)
CREAT SERPL-MCNC: 0.71 MG/DL (ref 0.6–1.3)
DIFFERENTIAL METHOD BLD: ABNORMAL
EOSINOPHIL # BLD: 0 K/UL (ref 0–0.4)
EOSINOPHIL NFR BLD: 0 % (ref 0–5)
ERYTHROCYTE [DISTWIDTH] IN BLOOD BY AUTOMATED COUNT: 21.2 % (ref 11.6–14.5)
GLOBULIN SER CALC-MCNC: 3.6 G/DL (ref 2–4)
GLUCOSE SERPL-MCNC: 165 MG/DL (ref 74–99)
GRAM STN SPEC: ABNORMAL
HCT VFR BLD AUTO: 32.8 % (ref 36–48)
HCV GENOTYPE: NORMAL
HCV RNA SERPL NAA+PROBE-ACNC: NORMAL IU/ML
HCV RNA SERPL NAA+PROBE-LOG IU: NORMAL LOG10 IU/ML
HGB BLD-MCNC: 10.3 G/DL (ref 13–16)
INR PPP: 1.5 (ref 0.8–1.2)
LACTATE SERPL-SCNC: 4.3 MMOL/L (ref 0.4–2)
LACTATE SERPL-SCNC: 5.4 MMOL/L (ref 0.4–2)
LACTATE SERPL-SCNC: 6.1 MMOL/L (ref 0.4–2)
LACTATE SERPL-SCNC: 6.4 MMOL/L (ref 0.4–2)
LYMPHOCYTES # BLD: 0.1 K/UL (ref 0.9–3.6)
LYMPHOCYTES NFR BLD: 2 % (ref 21–52)
MAGNESIUM SERPL-MCNC: 2.1 MG/DL (ref 1.6–2.6)
MCH RBC QN AUTO: 25.8 PG (ref 24–34)
MCHC RBC AUTO-ENTMCNC: 31.4 G/DL (ref 31–37)
MCV RBC AUTO: 82 FL (ref 74–97)
MONOCYTES # BLD: 0.1 K/UL (ref 0.05–1.2)
MONOCYTES NFR BLD: 2 % (ref 3–10)
NEUTS BAND NFR BLD MANUAL: 24 %
NEUTS SEG # BLD: 4.4 K/UL (ref 1.8–8)
NEUTS SEG NFR BLD: 72 % (ref 40–73)
PHOSPHATE SERPL-MCNC: 1.9 MG/DL (ref 2.5–4.9)
PLATELET # BLD AUTO: 90 K/UL (ref 135–420)
PMV BLD AUTO: 9.9 FL (ref 9.2–11.8)
POTASSIUM SERPL-SCNC: 3.8 MMOL/L (ref 3.5–5.5)
PROT SERPL-MCNC: 5.3 G/DL (ref 6.4–8.2)
PROTHROMBIN TIME: 17.8 SEC (ref 11.5–15.2)
RBC # BLD AUTO: 4 M/UL (ref 4.7–5.5)
RBC MORPH BLD: ABNORMAL
SERVICE CMNT-IMP: ABNORMAL
SERVICE CMNT-IMP: ABNORMAL
SODIUM SERPL-SCNC: 142 MMOL/L (ref 136–145)
T3FREE SERPL-MCNC: 1.4 PG/ML (ref 2.18–3.98)
T4 FREE SERPL-MCNC: 0.9 NG/DL (ref 0.7–1.5)
T4 SERPL-MCNC: 4.8 UG/DL (ref 4.7–13.3)
TEST INFORMATION, 550045: NORMAL
TROPONIN I SERPL-MCNC: 0.18 NG/ML (ref 0–0.04)
TSH SERPL DL<=0.05 MIU/L-ACNC: 1.19 UIU/ML (ref 0.36–3.74)
VANCOMYCIN TROUGH SERPL-MCNC: 9.5 UG/ML (ref 10–20)
WBC # BLD AUTO: 6.1 K/UL (ref 4.6–13.2)

## 2019-05-19 PROCEDURE — C8923 2D TTE W OR W/O FOL W/CON,CO: HCPCS

## 2019-05-19 PROCEDURE — 65660000001 HC RM ICU INTERMED STEPDOWN

## 2019-05-19 PROCEDURE — 84443 ASSAY THYROID STIM HORMONE: CPT

## 2019-05-19 PROCEDURE — 80076 HEPATIC FUNCTION PANEL: CPT

## 2019-05-19 PROCEDURE — 80048 BASIC METABOLIC PNL TOTAL CA: CPT

## 2019-05-19 PROCEDURE — 85610 PROTHROMBIN TIME: CPT

## 2019-05-19 PROCEDURE — 74011000250 HC RX REV CODE- 250: Performed by: INTERNAL MEDICINE

## 2019-05-19 PROCEDURE — 84100 ASSAY OF PHOSPHORUS: CPT

## 2019-05-19 PROCEDURE — 84439 ASSAY OF FREE THYROXINE: CPT

## 2019-05-19 PROCEDURE — 84481 FREE ASSAY (FT-3): CPT

## 2019-05-19 PROCEDURE — 74011250636 HC RX REV CODE- 250/636: Performed by: INTERNAL MEDICINE

## 2019-05-19 PROCEDURE — 84480 ASSAY TRIIODOTHYRONINE (T3): CPT

## 2019-05-19 PROCEDURE — 82553 CREATINE MB FRACTION: CPT

## 2019-05-19 PROCEDURE — 83605 ASSAY OF LACTIC ACID: CPT

## 2019-05-19 PROCEDURE — 85025 COMPLETE CBC W/AUTO DIFF WBC: CPT

## 2019-05-19 PROCEDURE — 80202 ASSAY OF VANCOMYCIN: CPT

## 2019-05-19 PROCEDURE — 36415 COLL VENOUS BLD VENIPUNCTURE: CPT

## 2019-05-19 PROCEDURE — 83735 ASSAY OF MAGNESIUM: CPT

## 2019-05-19 PROCEDURE — 74011250637 HC RX REV CODE- 250/637: Performed by: INTERNAL MEDICINE

## 2019-05-19 PROCEDURE — 82550 ASSAY OF CK (CPK): CPT

## 2019-05-19 PROCEDURE — 84436 ASSAY OF TOTAL THYROXINE: CPT

## 2019-05-19 PROCEDURE — 93005 ELECTROCARDIOGRAM TRACING: CPT

## 2019-05-19 RX ORDER — METHYLPREDNISOLONE 4 MG/1
4 TABLET ORAL 2 TIMES DAILY WITH MEALS
COMMUNITY

## 2019-05-19 RX ORDER — SORAFENIB 200 MG/1
400 TABLET, FILM COATED ORAL EVERY OTHER DAY
COMMUNITY

## 2019-05-19 RX ORDER — TRAMADOL HYDROCHLORIDE 50 MG/1
50 TABLET ORAL
Status: DISCONTINUED | OUTPATIENT
Start: 2019-05-19 | End: 2019-05-22 | Stop reason: HOSPADM

## 2019-05-19 RX ORDER — BENZONATATE 150 MG/1
100 CAPSULE ORAL
COMMUNITY

## 2019-05-19 RX ORDER — POLYETHYLENE GLYCOL 3350 17 G/17G
17 POWDER, FOR SOLUTION ORAL DAILY PRN
Status: DISCONTINUED | OUTPATIENT
Start: 2019-05-19 | End: 2019-05-22 | Stop reason: HOSPADM

## 2019-05-19 RX ORDER — TRAZODONE HYDROCHLORIDE 50 MG/1
25 TABLET ORAL
Status: DISCONTINUED | OUTPATIENT
Start: 2019-05-19 | End: 2019-05-22 | Stop reason: HOSPADM

## 2019-05-19 RX ORDER — IPRATROPIUM BROMIDE AND ALBUTEROL SULFATE 2.5; .5 MG/3ML; MG/3ML
3 SOLUTION RESPIRATORY (INHALATION)
Status: DISCONTINUED | OUTPATIENT
Start: 2019-05-19 | End: 2019-05-22 | Stop reason: HOSPADM

## 2019-05-19 RX ORDER — DEXTROAMPHETAMINE SACCHARATE, AMPHETAMINE ASPARTATE, DEXTROAMPHETAMINE SULFATE AND AMPHETAMINE SULFATE 1.25; 1.25; 1.25; 1.25 MG/1; MG/1; MG/1; MG/1
5 TABLET ORAL DAILY
COMMUNITY

## 2019-05-19 RX ORDER — TRAMADOL HYDROCHLORIDE 50 MG/1
50 TABLET ORAL DAILY
COMMUNITY

## 2019-05-19 RX ORDER — ALBUTEROL SULFATE 90 UG/1
2 AEROSOL, METERED RESPIRATORY (INHALATION)
COMMUNITY

## 2019-05-19 RX ORDER — SPIRONOLACTONE 50 MG/1
50 TABLET, FILM COATED ORAL 2 TIMES DAILY
COMMUNITY

## 2019-05-19 RX ORDER — BISACODYL 5 MG
10 TABLET, DELAYED RELEASE (ENTERIC COATED) ORAL DAILY PRN
Status: DISCONTINUED | OUTPATIENT
Start: 2019-05-19 | End: 2019-05-22 | Stop reason: HOSPADM

## 2019-05-19 RX ORDER — TRAZODONE HYDROCHLORIDE 50 MG/1
50 TABLET ORAL
COMMUNITY

## 2019-05-19 RX ADMIN — SODIUM CHLORIDE, SODIUM LACTATE, POTASSIUM CHLORIDE, AND CALCIUM CHLORIDE 150 ML/HR: 600; 310; 30; 20 INJECTION, SOLUTION INTRAVENOUS at 03:00

## 2019-05-19 RX ADMIN — SODIUM CHLORIDE 1000 MG: 900 INJECTION, SOLUTION INTRAVENOUS at 07:00

## 2019-05-19 RX ADMIN — METHYLPREDNISOLONE SODIUM SUCCINATE 4 MG: 40 INJECTION, POWDER, FOR SOLUTION INTRAMUSCULAR; INTRAVENOUS at 16:37

## 2019-05-19 RX ADMIN — PERFLUTREN 6 ML: 6.52 INJECTION, SUSPENSION INTRAVENOUS at 08:29

## 2019-05-19 RX ADMIN — SODIUM CHLORIDE, SODIUM LACTATE, POTASSIUM CHLORIDE, AND CALCIUM CHLORIDE 500 ML: 600; 310; 30; 20 INJECTION, SOLUTION INTRAVENOUS at 12:26

## 2019-05-19 RX ADMIN — LIDOCAINE HYDROCHLORIDE 10 ML: 20 SOLUTION ORAL; TOPICAL at 10:02

## 2019-05-19 RX ADMIN — TRAMADOL HYDROCHLORIDE 50 MG: 50 TABLET, FILM COATED ORAL at 16:14

## 2019-05-19 RX ADMIN — TRAMADOL HYDROCHLORIDE 50 MG: 50 TABLET, FILM COATED ORAL at 23:10

## 2019-05-19 RX ADMIN — SODIUM CHLORIDE 1000 MG: 900 INJECTION, SOLUTION INTRAVENOUS at 18:02

## 2019-05-19 RX ADMIN — POLYETHYLENE GLYCOL 3350 17 G: 17 POWDER, FOR SOLUTION ORAL at 13:54

## 2019-05-19 RX ADMIN — TRAZODONE HYDROCHLORIDE 25 MG: 50 TABLET ORAL at 23:00

## 2019-05-19 NOTE — PROGRESS NOTES
Shift Summary Pt is a 75yo male here with BLE cellulitis and sepsis. Pt received alert, oriented x4, on room air, fernandez, VSS, in no acute distress. VS monitored, Q2H turns, urine out put monitored via fernandez, PRN 0.5mg IV dilaudid for generalized pain, IV abx cont, cellulitis monitored closely and damp dressings changed, wound care consult ordered, labs drawn and monitored as ordered. Lactic acid continues to be elevated with most recent result 5.4, no new orders when reported to Dr. Dov Singh. At approximately 06:30 pt became tachycardic with resting -150s. Pt endorsed mild CP and SOB. Called Dr. Dov Singh to report change. EKG and cardiac enzymes ordered and completed. No acute events over night. VSS. Cont to monitor.

## 2019-05-19 NOTE — PROGRESS NOTES
Progress Note Assessment: 1. Left wrist pain, improved. No evidence of septic arthritis (no fluid obtainable with image guided aspiration). Patient states \"my wrist feels 100 times better\" 2. NO indication for orthopaedic surgical intervention PLAN:   
1. Follow up outpatient PRN for left wrist pain if it does not continue to improve. 2. Available as needed upon request   
 
 
 
HPI: Denae Angel is a 68 y.o. male patient without new ortho complaints. Physical Assessment: 
General: alert and oriented times 3 Wound: none ortho Extremities:  Able to flex/extend fingers, wrist, and pronate/supinate forearm with minimal discomfort. Some pain with wrist flexion/extension. Swelling at hand and wrist, but patient states \"100 times better\". No erythema over wrist.  Elbow nontender. Able to move shoulder but all planes limited. Dressing:  none ortho - Atiya Greene DHSshu, ADE 
5/19/2019 Office [phone]

## 2019-05-19 NOTE — PROGRESS NOTES
Problem: Pressure Injury - Risk of 
Goal: *Prevention of pressure injury Description Document Moustapha Scale and appropriate interventions in the flowsheet. Outcome: Progressing Towards Goal 
  
Problem: Patient Education: Go to Patient Education Activity Goal: Patient/Family Education Outcome: Progressing Towards Goal 
  
Problem: General Medical Care Plan Goal: *Vital signs within specified parameters Outcome: Progressing Towards Goal 
Goal: *Labs within defined limits Outcome: Progressing Towards Goal 
Goal: *Absence of infection signs and symptoms Outcome: Progressing Towards Goal 
Goal: *Optimal pain control at patient's stated goal 
Outcome: Progressing Towards Goal 
Goal: *Skin integrity maintained Outcome: Progressing Towards Goal 
Goal: *Fluid volume balance Outcome: Progressing Towards Goal 
Goal: *Optimize nutritional status Outcome: Progressing Towards Goal 
Goal: *Anxiety reduced or absent Outcome: Progressing Towards Goal 
Goal: *Progressive mobility and function (eg: ADL's) Outcome: Progressing Towards Goal 
  
Problem: Pain Goal: *Control of Pain Outcome: Progressing Towards Goal 
Goal: *PALLIATIVE CARE:  Alleviation of Pain Outcome: Progressing Towards Goal 
  
Problem: Infection - Risk of, Urinary Catheter-Associated Urinary Tract Infection Goal: *Absence of infection signs and symptoms Outcome: Progressing Towards Goal 
  
Problem: Falls - Risk of 
Goal: *Absence of Falls Description Document Calli Alvarado Fall Risk and appropriate interventions in the flowsheet. Outcome: Progressing Towards Goal 
  
Problem: Patient Education: Go to Patient Education Activity Goal: Patient/Family Education Outcome: Progressing Towards Goal 
  
Problem: Discharge Planning Goal: *Discharge to safe environment Outcome: Progressing Towards Goal 
  
Problem: Patient Education: Go to Patient Education Activity Goal: Patient/Family Education Outcome: Progressing Towards Goal

## 2019-05-19 NOTE — PROGRESS NOTES
ORTHO Left wrist fluoroscopy noted reviewed. No joint fluid obtainable and needle placement confirmed with contrast. Lack of fluid effectively r/o the possibility of a septic left wrist joint.  
 
 
-Geovani Javier, [facility], ADE \"LEFT WRIST ASPIRATION 
  
COMPARISON: Left wrist radiographs 5/17/2019; CT left forearm 5/17/2019. 
  
CLINICAL INFORMATION: 68year-old with questionable history of prior trauma to 
the left wrist presenting with left wrist pain and swelling. Evaluation for 
potential septic arthritis is requested. 
  
PROCEDURE: 
Preliminary radiographs of the left wrist demonstrate faint chondrocalcinosis 
within both the scapholunate and triangular fibrocartilage complex. Joint spaces 
appear preserved. No fracture or acute malalignment. . 
  
GUIDANCE: Fluoroscopic guidance was used to position (and confirm the position 
of) the needle and contrast.   
Image(s) saved in PACS: Postoperative. 
  
After explaining the procedure to the patient, including the potential risks, 
benefits, and alternatives, informed written and verbal consent was obtained. The skin overlying the patient's left wrist was prepped and draped in usual 
sterile fashion. One percent lidocaine was infiltrated into the subcutaneous 
tissues of the dorsal left wrist to achieve local anesthesia. Under 
fluoroscopic guidance, a 20 gauge spinal needle was advanced into the joint, 
initially targeting the distal radial ulnar joint, subsequent targeting of the 
ulnar styloid recess, and finally the radiocarpal articulation. At each of these 
locations, aspiration was performed without fluid obtained. [A small amount of 
nonionic contrast was gently hand injected into the joint to confirm 
intra-articular position. The needle was then removed. 
  
The patient tolerated procedure well, with no initial complications. 
  
: JAVRIS Ramsey IMPRESSION: 
 1.  Status post uncomplicated left wrist aspiration yielding no fluid. \"

## 2019-05-19 NOTE — PROGRESS NOTES
46: Called Dr. Jonh Coffey about patient's increasing tachycardia overnight with correlating lactic acid level now at 5.6. HR currently 145, /70, UOP 30-40ml/hr. Orders were received. Will continue to monitor.

## 2019-05-19 NOTE — PROGRESS NOTES
Physical Exam  
Constitutional: He is oriented to person, place, and time. No distress. Abdominal: He exhibits distension. Neurological: He is oriented to person, place, and time. Skin: Skin is warm and dry. Abrasion, bruising, lesion, purpura and rash noted. He is not diaphoretic. There is erythema. There is pallor.   
 
  
Skin check with [address] [place] [address],[address], RN

## 2019-05-19 NOTE — PROGRESS NOTES
Gastrointestinal Progress Note Patient Name: Osmani Linares Today's Date: 5/19/2019 Admit Date: 5/17/2019 Assessment:  
1) Acute liver injury with AST>ALT 8740/3237; aminotransferases steadily falling and overall pattern most consistent with ischemic hepatitis 2) Persistent tachycardia; etiology unclear: infection, pain, beta blocker withdrawal (appears he was on toprol XL), adrenal insufficiency (he tells me he was on a chronic steroid?), PE 
3) Liver cancer, s/p prior treatments over the past several years, currently off therapy with progression of disease suggested by recent CT scan 4) Cirrhosis, presumably due to hepatitis C treated in the past 
5) Recent falls, details unclear; ?period of time down at home 6) Recent weakness 7) Right posterior tibial vein thrombosis 8) Elevated troponin without chest pain 
  
  
Recommendation:  
-Await acute hepatitis pane, AFP 
-Awaiting doppler US of hepatic veins and artery which I have re-ordered 
-Monitor liver enzymes, INR, and mental status -Avoid hypotension 
-No limitations to diet from my perspective once ultrasounds are completed 
-Will follow Recommendations and thoughts regarding patient's tachycardia were discussed with Dr Taiwo Bustos Subjective:  
 
Osmani Linares is a 68 y.o. Male seen for  acute liver injury in the setting of cirrhosis and liver cancer. Has been persistently tachycardic last 24 hours; HR in high 140's during my exam. Has been eating. Minimal abdominal pain. On vancomycin for strep in the blood Current Facility-Administered Medications Medication Dose Route Frequency  magic mouthwash  10 mL Oral Q6H PRN  
 glucose chewable tablet 16 g  4 Tab Oral PRN  
 glucagon (GLUCAGEN) injection 1 mg  1 mg IntraMUSCular PRN  
 dextrose (D50) infusion 12.5-25 g  25-50 mL IntraVENous PRN  
 HYDROmorphone (DILAUDID) syringe 0.5 mg  0.5 mg IntraVENous Q4H PRN  
  vancomycin (VANCOCIN) 1,000 mg in 0.9% sodium chloride (MBP/ADV) 250 mL adv  1,000 mg IntraVENous Q12H  VANCOMYCIN INFORMATION NOTE   Other ONCE  
 nicotine (NICODERM CQ) 21 mg/24 hr patch 1 Patch  1 Patch TransDERmal DAILY  sodium chloride (NS) flush 5-10 mL  5-10 mL IntraVENous PRN  
 sodium chloride (NS) flush 5-10 mL  5-10 mL IntraVENous PRN  
 lactated Ringers infusion  150 mL/hr IntraVENous CONTINUOUS  
 VANCOMYCIN INFORMATION NOTE   Other Rx Dosing/Monitoring  [START ON 5/20/2019] VANCOMYCIN INFORMATION NOTE   Other ONCE  
  
 
 
Objective:  
 
Physical Exam: 
 
, /84, RR 21, O2 98% Gen: Awake and alert CV: Regular, tachycardic Abd: Soft, mild RUQ tenderness without rebound/guarding. Data Review: 
 
 
Labs: Results:  
   
Chemistry Recent Labs 05/19/19 0230 05/18/19 
0855 05/18/19 
0220 05/17/19 
1220 *  --  64* 86   --  141 134* K 3.8  --  3.5 3.8 *  --  108 98* CO2 22  --  26 25 BUN 22*  --  16 21* CREA 0.71  --  0.44* 0.95  
CA 8.0*  --  7.6* 8.2* AGAP 11  --  7 11 BUCR 31*  --  36* 22* * 778* 771* 426* TP 5.3* 5.2* 5.2* 6.6 ALB 1.7* 1.8* 1.8* 2.3*  
GLOB 3.6 3.4 3.4 4.3* AGRAT 0.5* 0.5* 0.5* 0.5* CBC w/Diff Recent Labs 05/19/19 0230 05/18/19 
0220 05/17/19 
1220 WBC 6.1 5.8 10.2 RBC 4.00* 3.53* 3.93* HGB 10.3* 9.0* 9.8* HCT 32.8* 28.6* 31.4* PLT 90* 79* 133* GRANS 72 92* 81* LYMPH 2* 5* 5* EOS 0 0 0 Coagulation Recent Labs 05/19/19 0230 05/18/19 
0220 PTP 17.8* 19.8* INR 1.5* 1.7* Liver Enzymes Recent Labs 05/19/19 
0230 TP 5.3* ALB 1.7* * SGOT 3,305* ALT 3,140* Gisela Azul MD 
May 19, 2019

## 2019-05-19 NOTE — PROGRESS NOTES
Internal Medicine Progress Note NAME: Devorah Duke :  1950 MRM:  [id number] Date/Time: 2019 ASSESSMENT/PLAN: 
 
 
# Admitted with  Sepsis ([facility] 75.) (2019). Unclear source. Possible septic wrist. Sepsis protocol started in er. Follow cultures. Ortho and ID consulted. per ID fever due to other illness than sepsis and Abx stopped. - : blood GPC, iv vanco added. Streptc. Sensitive to vanco 
  
 #  Altered mental state (2019). Unclear etio. Likely toxic -  metabolic encephalopathy. CT head, ammonia level negative. Neuro-observation.  
- improved mentation. Back to normal 
  
 # Wrist pain, acute, left (2019). swelling and tender. Ortho consulted. Plan for aspiration 
  
#  Dehydration (2019). Aggressive IVF # Sinus tachycardia. Bolus IVF. TTE. TSH/FT4 normal , slight low T3 . Cardiology consulted 
  
# Rhabdo. Elevated CPK. IVF . Improving CK level with hydration. Follow  
  
 # Ischemic Hepatitis presented with Liver failure, ho liver cirrhoses follow with hepatologist . GI consulted. Hydration. Follow labs. Check PT/INR.  
 - improving AST/ALT # Coagulopathy due to liver disease. # Liver cancer ([facility] 75.) (2019). Will check AFP. GI on board 
  
#  HTN (hypertension) (2019). Control BP 
  
#  Falls. PT/OT when stable 
  
# TCP. ? Liver disease related # Hypoalbuminemia # Musculoskeletal pains from fals. Pain control #  Acute non-occlusive thrombus present in 1 of 2 right posterior tibial vein(s). His INR elevated.  
  
# APS involved. Lab Review:  
 
Recent Labs 19 
02319 
0220 19 
1220 WBC 6.1 5.8 10.2 HGB 10.3* 9.0* 9.8* HCT 32.8* 28.6* 31.4* PLT 90* 79* 133* Recent Labs 19 
0230 19 
0855 19 
0220 19 
1220   --  141 134* K 3.8  --  3.5 3.8 *  --  108 98* CO2 22  --  26 25 *  --  64* 86 BUN 22*  --  16 21* CREA 0.71  --  0.44* 0.95  
 CA 8.0*  --  7.6* 8.2* MG 2.1  --  2.0  --   
PHOS 1.9*  --  3.2  --   
ALB 1.7* 1.8* 1.8* 2.3* TBILI 1.5* 1.7* 2.0* 2.4* SGOT 3,305* 6,539* 8,575* 8,740* ALT 3,140* 4,064* 4,178* 3,237* INR 1.5*  --  1.7* 1.5* Lab Results Component Value Date/Time Glucose (POC) 83 05/18/2019 08:49 AM  
 
No results for input(s): PH, PCO2, PO2, HCO3, FIO2 in the last 72 hours. Recent Labs 05/19/19 
0230 05/18/19 
0220 05/17/19 
1220 INR 1.5* 1.7* 1.5* No results found for: SDES Lab Results Component Value Date/Time Culture result: (A) 05/17/2019 12:35 PM  
  AEROBIC AND ANAEROBIC BOTTLES STREPTOCOCCI, BETA HEMOLYTIC GROUP B Culture result: (A) 05/17/2019 12:20 PM  
  AEROBIC AND ANAEROBIC BOTTLES STREPTOCOCCI, BETA HEMOLYTIC GROUP B Culture result: NO GROWTH 6 DAYS 03/28/2018 08:00 PM  
 
 
All Cardiac Markers in the last 24 hours:  
Lab Results Component Value Date/Time  (H) 05/19/2019 06:50 AM  
  (H) 05/19/2019 02:30 AM  
 CKMB 4.2 (H) 05/19/2019 06:50 AM  
 CKND1 0.9 05/19/2019 06:50 AM  
 TROIQ 0.18 (H) 05/19/2019 06:50 AM  
 
ABG: No results found for: PH, PHI, PCO2, PCO2I, PO2, PO2I, HCO3, HCO3I, FIO2, FIO2I Liver Panel:  
Lab Results Component Value Date/Time ALB 1.7 (L) 05/19/2019 02:30 AM  
 CBIL 0.8 (H) 05/19/2019 02:30 AM  
 TP 5.3 (L) 05/19/2019 02:30 AM  
 GLOB 3.6 05/19/2019 02:30 AM  
 AGRAT 0.5 (L) 05/19/2019 02:30 AM  
 SGOT 3,305 (H) 05/19/2019 02:30 AM  
 ALT 3,140 (H) 05/19/2019 02:30 AM  
  (H) 05/19/2019 02:30 AM  
 
 
 
  
Subjective: Chief Complaint:     
 
I want my steroid now. ROS: 
(bold if positive,otherwise negative) Fever/chills ,  Dysuria Cough , Sputum , SOB/JOHNS , Chest Pain Diarrhea ,Nausea/Vomit , Abd Pain , Constipation Objective:  
 
Vitals: 
Last 24hrs VS reviewed since prior progress note. Most recent are: 
 
Visit Vitals /66 Pulse (!) 149 Temp 99.5 °F (37.5 °C) Resp 21  
 Ht 5' 6\" (1.676 m) Wt 62.6 kg (138 lb) SpO2 98% BMI 22.27 kg/m² SpO2 Readings from Last 6 Encounters:  
05/19/19 98% 03/04/19 100% 10/03/18 96% 03/28/18 95% O2 Flow Rate (L/min): 3 l/min Intake/Output Summary (Last 24 hours) at 5/19/2019 1225 Last data filed at 5/19/2019 0600 Gross per 24 hour Intake 2750 ml Output 705 ml Net 2045 ml Physical Exam:  
Gen: look better today,   Appear stated age, Well-developed,   in no acute distress HEENT:  Head atraumatic, normocephalic , hearing intact to voice, dry mucous membranes. Neck:  Supple, no masses I appreciate, thyroid non-tender. Resp:  No accessory muscle use,Bilateral BS present, clear breath sounds without wheezes rales or rhonchi 
Card:  No murmurs, normal S1, S2 without thrills, bruits. +2-3  Leg peripheral edema. Abd:  Soft, non-tender, non-distended, normoactive bowel sounds are present, no palpable organomegaly Musc: L wrist swelling tender,  No cyanosis or clubbing. Skin: multiple skin abrasions and small ulcers indicate falls,  No rashes   skin turgor is good. Neuro: Alert,    Cranial nerves are grossly intact, no clear area of focal motor weakness, can follows commands appropriately Telemetry reviewed:   normal sinus rhythm Medications Reviewed: (see below) Lab Data Reviewed: (see below) 
 
______________________________________________________________________ Medications:  
 
Current Facility-Administered Medications Medication Dose Route Frequency  magic mouthwash  10 mL Oral Q6H PRN  
 methylPREDNISolone (PF) (SOLU-MEDROL) injection 4 mg  4 mg IntraVENous ACB&D  
 lactated ringers bolus infusion 500 mL  500 mL IntraVENous ONCE  
 glucose chewable tablet 16 g  4 Tab Oral PRN  
 glucagon (GLUCAGEN) injection 1 mg  1 mg IntraMUSCular PRN  
 dextrose (D50) infusion 12.5-25 g  25-50 mL IntraVENous PRN  
 HYDROmorphone (DILAUDID) syringe 0.5 mg  0.5 mg IntraVENous Q4H PRN  
  vancomycin (VANCOCIN) 1,000 mg in 0.9% sodium chloride (MBP/ADV) 250 mL adv  1,000 mg IntraVENous Q12H  VANCOMYCIN INFORMATION NOTE   Other ONCE  
 nicotine (NICODERM CQ) 21 mg/24 hr patch 1 Patch  1 Patch TransDERmal DAILY  sodium chloride (NS) flush 5-10 mL  5-10 mL IntraVENous PRN  
 sodium chloride (NS) flush 5-10 mL  5-10 mL IntraVENous PRN  
 lactated Ringers infusion  150 mL/hr IntraVENous CONTINUOUS  
 VANCOMYCIN INFORMATION NOTE   Other Rx Dosing/Monitoring  [START ON 5/20/2019] VANCOMYCIN INFORMATION NOTE   Other ONCE Total time spent with patient: 35 minutes Care Plan discussed with: Patient, Nursing Staff, Consultant/Specialist and >50% of time spent in counseling and coordination of care  GI Discussed:  Care Plan Prophylaxis:  SCD's Disposition:  TD Attending Physician: Lv Castaneda MD

## 2019-05-19 NOTE — PROGRESS NOTES
[facility] Date of  Admission: 5/17/2019 11:46 AM  
Primary Care Physician:  Karin Chavarria MD 
 
 Assessment:  
 
-Sent by APS due to falls and incontinence. -Hepatocellular carcinoma, CT abdomen 5/17/19: persistent cirrhotic appearance of the liver with ill-defined hypoattenuation in the right lobe which may represent pt's treated hepatocellular carcinoma. There is extensive new hypoattenuation involving the left lobe without obvious etiology. Cannot exclude mass on the vascular compromise related to portal vein thrombosis potentially cause this appearance over the left portal vein does not appear patent. There is thrombus at the bifurcation of the main portal vein with complete occlusion of the right portal vein. 
-Possible new RLL pulmonary nodule with small area of RLL atelectasis or infiltrate. 
-Mildly elevated troponin, no chest pain or ischemic electrocardiographic changes. Possibilities include tachycardia and other hemodynamic alterations. Patient also appears to have some element of rhabdomyolysis Echocardiogram completed this morning. Patient has hyperdynamic left ventricular systolic function. The LV chamber size is small with some possibility of persistent volume depletion. Tachycardia more pronounced this a.m. Twelve-lead ECG ordered and pending. Appears to be sinus tachycardia on the monitor. 
-Hypoalbuminemia 
-Anemia Plan:  
 
-Echo demonstrates small LV chamber size and hyperdynamic systolic function. Would continue intravenous fluid. There may be some element of anxiety as the patient's son is visiting today from [place]. He does not know that his father was in a multi vehicle accident at [facility] last week and is concerned that he will take his license. He is is also a lifelong cigarette smoker. With reference to GI note, blood pressure still quite marginal for restart of beta-blockers.   Would consider either restart of steroid or cortisol provocative testing. History of Present Illness: This is a 68 y.o. male admitted for Sepsis ([facility] 75.) [A41.9]. Patient complains of: \"They have not given me my medicine yet \" Brenda Veronica is a 68 y.o. male with PMHx as described above. He was sent to the ER by police and APS due to falls and incontinence. According to ER note, he did not know why he was here and intermittently c/o extremity pain. Cardiac risk factors: male gender, hypertension Review of Symptoms:   
Review of systems not obtained due to patient factors. Past Medical History:  
 
Past Medical History:  
Diagnosis Date  Cancer ([facility].)  Hypertension Social History:  
 
Social History Socioeconomic History  Marital status:  Spouse name: Not on file  Number of children: Not on file  Years of education: Not on file  Highest education level: Not on file Tobacco Use  Smoking status: Current Every Day Smoker  Smokeless tobacco: Never Used Substance and Sexual Activity  Alcohol use: No  
 Drug use: No  
 Sexual activity: Not Currently Family History:  
History reviewed. No pertinent family history. Medications:  
No Known Allergies Current Facility-Administered Medications Medication Dose Route Frequency  magic mouthwash  10 mL Oral Q6H PRN  
 methylPREDNISolone (PF) (SOLU-MEDROL) injection 4 mg  4 mg IntraVENous ACB&D  
 glucose chewable tablet 16 g  4 Tab Oral PRN  
 glucagon (GLUCAGEN) injection 1 mg  1 mg IntraMUSCular PRN  
 dextrose (D50) infusion 12.5-25 g  25-50 mL IntraVENous PRN  
 HYDROmorphone (DILAUDID) syringe 0.5 mg  0.5 mg IntraVENous Q4H PRN  
 vancomycin (VANCOCIN) 1,000 mg in 0.9% sodium chloride (MBP/ADV) 250 mL adv  1,000 mg IntraVENous Q12H  VANCOMYCIN INFORMATION NOTE   Other ONCE  
 nicotine (NICODERM CQ) 21 mg/24 hr patch 1 Patch  1 Patch TransDERmal DAILY  sodium chloride (NS) flush 5-10 mL  5-10 mL IntraVENous PRN  
 sodium chloride (NS) flush 5-10 mL  5-10 mL IntraVENous PRN  
 lactated Ringers infusion  150 mL/hr IntraVENous CONTINUOUS  
 VANCOMYCIN INFORMATION NOTE   Other Rx Dosing/Monitoring  [START ON 5/20/2019] VANCOMYCIN INFORMATION NOTE   Other ONCE Physical Exam:  
 
Visit Vitals BP (!) 87/66 Pulse (!) 145 Temp 99.5 °F (37.5 °C) Resp 26 Ht 5' 6\" (1.676 m) Wt 138 lb (62.6 kg) SpO2 98% BMI 22.27 kg/m² BP Readings from Last 3 Encounters:  
05/19/19 (!) 87/66  
03/04/19 153/79  
10/03/18 129/58 Pulse Readings from Last 3 Encounters:  
05/19/19 (!) 145  
03/04/19 79  
10/03/18 74 Wt Readings from Last 3 Encounters:  
05/19/19 138 lb (62.6 kg) 03/04/19 113 lb (51.3 kg) 10/03/18 151 lb (68.5 kg) General:  Resting comfortably, snoring, no apparent distress, appears stated age Neck:  No JVD Lungs:  clear to auscultation bilaterally to anterior lung fields Heart:  Regular rate and rhythm Abdomen:  abdomen is soft without significant tenderness or guarding Extremities:  Areas of ecchymosis to upper extremities, 1-2+ edema to lower extremities Skin: Warm and dry. Neuro: no involuntary movements Psych: non focal 
 
 Data Review:  
 
Recent Labs 05/19/19 
0230 05/18/19 
0220 05/17/19 
1220 WBC 6.1 5.8 10.2 HGB 10.3* 9.0* 9.8* HCT 32.8* 28.6* 31.4* PLT 90* 79* 133* Recent Labs 05/19/19 
0230 05/18/19 
0855 05/18/19 
0220 05/17/19 
1220   --  141 134* K 3.8  --  3.5 3.8 *  --  108 98* CO2 22  --  26 25 *  --  64* 86 BUN 22*  --  16 21* CREA 0.71  --  0.44* 0.95  
CA 8.0*  --  7.6* 8.2* MG 2.1  --  2.0  --   
PHOS 1.9*  --  3.2  --   
ALB 1.7* 1.8* 1.8* 2.3*  
SGOT 3,305* 6,539* 8,575* 8,740* ALT 3,140* 4,064* 4,178* 3,237* INR 1.5*  --  1.7* 1.5* Results for orders placed or performed during the hospital encounter of 05/17/19 EKG, 12 LEAD, INITIAL Result Value Ref Range Ventricular Rate 99 BPM  
 Atrial Rate 99 BPM  
 P-R Interval 114 ms QRS Duration 124 ms Q-T Interval 368 ms QTC Calculation (Bezet) 472 ms Calculated P Axis 62 degrees Calculated R Axis -62 degrees Calculated T Axis 68 degrees Diagnosis Normal sinus rhythm Right bundle branch block Left anterior fascicular block Bifascicular block Left ventricular hypertrophy Possible Lateral infarct (cited on or before 04-MAR-2019) Abnormal ECG When compared with ECG of 04-MAR-2019 17:36, No significant change was found Confirmed by Bo Hernandez ([id number]) on 5/18/2019 6:47:48 AM 
  
 
 
All Cardiac Markers in the last 24 hours:   
Lab Results Component Value Date/Time  (H) 05/19/2019 06:50 AM  
  (H) 05/19/2019 02:30 AM  
 CKMB 4.2 (H) 05/19/2019 06:50 AM  
 CKND1 0.9 05/19/2019 06:50 AM  
 TROIQ 0.18 (H) 05/19/2019 06:50 AM  
 
 
Signed By: Tamika Isaacs MD   
 May 19, 2019

## 2019-05-20 ENCOUNTER — APPOINTMENT (OUTPATIENT)
Dept: GENERAL RADIOLOGY | Age: 69
DRG: 871 | End: 2019-05-20
Attending: INTERNAL MEDICINE
Payer: MEDICARE

## 2019-05-20 ENCOUNTER — APPOINTMENT (OUTPATIENT)
Dept: VASCULAR SURGERY | Age: 69
DRG: 871 | End: 2019-05-20
Attending: INTERNAL MEDICINE
Payer: MEDICARE

## 2019-05-20 LAB
ALBUMIN SERPL-MCNC: 1.5 G/DL (ref 3.4–5)
ALBUMIN/GLOB SERPL: 0.4 {RATIO} (ref 0.8–1.7)
ALP SERPL-CCNC: 665 U/L (ref 45–117)
ALT SERPL-CCNC: 1968 U/L (ref 16–61)
ANION GAP SERPL CALC-SCNC: 8 MMOL/L (ref 3–18)
AST SERPL-CCNC: 1320 U/L (ref 15–37)
ATRIAL RATE: 134 BPM
BASOPHILS # BLD: 0 K/UL (ref 0–0.1)
BASOPHILS NFR BLD: 0 % (ref 0–2)
BILIRUB SERPL-MCNC: 1.6 MG/DL (ref 0.2–1)
BUN SERPL-MCNC: 16 MG/DL (ref 7–18)
BUN/CREAT SERPL: 33 (ref 12–20)
CALCIUM SERPL-MCNC: 7.9 MG/DL (ref 8.5–10.1)
CALCULATED P AXIS, ECG09: 64 DEGREES
CALCULATED R AXIS, ECG10: -62 DEGREES
CALCULATED T AXIS, ECG11: 81 DEGREES
CHLORIDE SERPL-SCNC: 110 MMOL/L (ref 100–108)
CK SERPL-CCNC: 194 U/L (ref 39–308)
CO2 SERPL-SCNC: 24 MMOL/L (ref 21–32)
COMMON HEPATIC PSV: 125.4 CM/S
CREAT SERPL-MCNC: 0.48 MG/DL (ref 0.6–1.3)
DIAGNOSIS, 93000: NORMAL
DIFFERENTIAL METHOD BLD: ABNORMAL
EOSINOPHIL # BLD: 0 K/UL (ref 0–0.4)
EOSINOPHIL NFR BLD: 0 % (ref 0–5)
ERYTHROCYTE [DISTWIDTH] IN BLOOD BY AUTOMATED COUNT: 21.2 % (ref 11.6–14.5)
GLOBULIN SER CALC-MCNC: 3.5 G/DL (ref 2–4)
GLUCOSE BLD STRIP.AUTO-MCNC: 203 MG/DL (ref 70–110)
GLUCOSE SERPL-MCNC: 161 MG/DL (ref 74–99)
HAV IGM SER QL: NEGATIVE
HBV CORE IGM SER QL: NEGATIVE
HBV SURFACE AG SER QL: 0.77 INDEX
HBV SURFACE AG SER QL: NEGATIVE
HCT VFR BLD AUTO: 27.2 % (ref 36–48)
HCV AB SER IA-ACNC: >11 INDEX
HCV AB SERPL QL IA: POSITIVE
HCV COMMENT,HCGAC: ABNORMAL
HGB BLD-MCNC: 8.2 G/DL (ref 13–16)
INR PPP: 1.3 (ref 0.8–1.2)
LACTATE SERPL-SCNC: 2.7 MMOL/L (ref 0.4–2)
LACTATE SERPL-SCNC: 3.2 MMOL/L (ref 0.4–2)
LACTATE SERPL-SCNC: 3.4 MMOL/L (ref 0.4–2)
LACTATE SERPL-SCNC: 4.1 MMOL/L (ref 0.4–2)
LYMPHOCYTES # BLD: 0.1 K/UL (ref 0.9–3.6)
LYMPHOCYTES NFR BLD: 2 % (ref 21–52)
MAGNESIUM SERPL-MCNC: 2 MG/DL (ref 1.6–2.6)
MCH RBC QN AUTO: 24.6 PG (ref 24–34)
MCHC RBC AUTO-ENTMCNC: 30.1 G/DL (ref 31–37)
MCV RBC AUTO: 81.7 FL (ref 74–97)
MONOCYTES # BLD: 0.1 K/UL (ref 0.05–1.2)
MONOCYTES NFR BLD: 1 % (ref 3–10)
NEUTS BAND NFR BLD MANUAL: 17 %
NEUTS SEG # BLD: 4.6 K/UL (ref 1.8–8)
NEUTS SEG NFR BLD: 80 % (ref 40–73)
P-R INTERVAL, ECG05: 128 MS
PHOSPHATE SERPL-MCNC: 2.2 MG/DL (ref 2.5–4.9)
PLATELET # BLD AUTO: 93 K/UL (ref 135–420)
PMV BLD AUTO: 9.8 FL (ref 9.2–11.8)
POTASSIUM SERPL-SCNC: 4.3 MMOL/L (ref 3.5–5.5)
PROT SERPL-MCNC: 5 G/DL (ref 6.4–8.2)
PROTHROMBIN TIME: 16.2 SEC (ref 11.5–15.2)
Q-T INTERVAL, ECG07: 312 MS
QRS DURATION, ECG06: 118 MS
QTC CALCULATION (BEZET), ECG08: 465 MS
RBC # BLD AUTO: 3.33 M/UL (ref 4.7–5.5)
RBC MORPH BLD: ABNORMAL
SODIUM SERPL-SCNC: 142 MMOL/L (ref 136–145)
SP1: ABNORMAL
SP2: ABNORMAL
SP3: ABNORMAL
VENTRICULAR RATE, ECG03: 134 BPM
WBC # BLD AUTO: 5.7 K/UL (ref 4.6–13.2)

## 2019-05-20 PROCEDURE — 77030010545

## 2019-05-20 PROCEDURE — 83036 HEMOGLOBIN GLYCOSYLATED A1C: CPT

## 2019-05-20 PROCEDURE — 74011250636 HC RX REV CODE- 250/636: Performed by: INTERNAL MEDICINE

## 2019-05-20 PROCEDURE — 74011000258 HC RX REV CODE- 258: Performed by: INTERNAL MEDICINE

## 2019-05-20 PROCEDURE — 83605 ASSAY OF LACTIC ACID: CPT

## 2019-05-20 PROCEDURE — 74011000255 HC RX REV CODE- 255: Performed by: HOSPITALIST

## 2019-05-20 PROCEDURE — 82550 ASSAY OF CK (CPK): CPT

## 2019-05-20 PROCEDURE — 85610 PROTHROMBIN TIME: CPT

## 2019-05-20 PROCEDURE — 84100 ASSAY OF PHOSPHORUS: CPT

## 2019-05-20 PROCEDURE — 93976 VASCULAR STUDY: CPT

## 2019-05-20 PROCEDURE — 74011000250 HC RX REV CODE- 250: Performed by: INTERNAL MEDICINE

## 2019-05-20 PROCEDURE — 92611 MOTION FLUOROSCOPY/SWALLOW: CPT

## 2019-05-20 PROCEDURE — 77030012935 HC DRSG AQUACEL BMS -B

## 2019-05-20 PROCEDURE — 92526 ORAL FUNCTION THERAPY: CPT

## 2019-05-20 PROCEDURE — 74011250637 HC RX REV CODE- 250/637: Performed by: INTERNAL MEDICINE

## 2019-05-20 PROCEDURE — 74230 X-RAY XM SWLNG FUNCJ C+: CPT

## 2019-05-20 PROCEDURE — 87040 BLOOD CULTURE FOR BACTERIA: CPT

## 2019-05-20 PROCEDURE — 77030021352 HC CBL LD SYS DISP COVD -B

## 2019-05-20 PROCEDURE — 85025 COMPLETE CBC W/AUTO DIFF WBC: CPT

## 2019-05-20 PROCEDURE — 83735 ASSAY OF MAGNESIUM: CPT

## 2019-05-20 PROCEDURE — 65660000000 HC RM CCU STEPDOWN

## 2019-05-20 PROCEDURE — 82962 GLUCOSE BLOOD TEST: CPT

## 2019-05-20 PROCEDURE — 80053 COMPREHEN METABOLIC PANEL: CPT

## 2019-05-20 PROCEDURE — 36415 COLL VENOUS BLD VENIPUNCTURE: CPT

## 2019-05-20 RX ADMIN — BARIUM SULFATE 15 ML: 400 SUSPENSION ORAL at 11:16

## 2019-05-20 RX ADMIN — SODIUM CHLORIDE 1250 MG: 900 INJECTION, SOLUTION INTRAVENOUS at 08:34

## 2019-05-20 RX ADMIN — SODIUM CHLORIDE, SODIUM LACTATE, POTASSIUM CHLORIDE, AND CALCIUM CHLORIDE 150 ML/HR: 600; 310; 30; 20 INJECTION, SOLUTION INTRAVENOUS at 03:00

## 2019-05-20 RX ADMIN — CEFAZOLIN 1 G: 1 INJECTION, POWDER, FOR SOLUTION INTRAMUSCULAR; INTRAVENOUS at 11:53

## 2019-05-20 RX ADMIN — BARIUM SULFATE 15 ML: 400 PASTE ORAL at 11:16

## 2019-05-20 RX ADMIN — LIDOCAINE HYDROCHLORIDE 10 ML: 20 SOLUTION ORAL; TOPICAL at 14:00

## 2019-05-20 RX ADMIN — METHYLPREDNISOLONE SODIUM SUCCINATE 4 MG: 40 INJECTION, POWDER, FOR SOLUTION INTRAMUSCULAR; INTRAVENOUS at 22:19

## 2019-05-20 RX ADMIN — CEFAZOLIN 1 G: 1 INJECTION, POWDER, FOR SOLUTION INTRAMUSCULAR; INTRAVENOUS at 18:03

## 2019-05-20 RX ADMIN — BARIUM SULFATE 370 ML: 0.81 POWDER, FOR SUSPENSION ORAL at 11:17

## 2019-05-20 RX ADMIN — METHYLPREDNISOLONE SODIUM SUCCINATE 4 MG: 40 INJECTION, POWDER, FOR SOLUTION INTRAMUSCULAR; INTRAVENOUS at 06:10

## 2019-05-20 RX ADMIN — SODIUM CHLORIDE, SODIUM LACTATE, POTASSIUM CHLORIDE, AND CALCIUM CHLORIDE 150 ML/HR: 600; 310; 30; 20 INJECTION, SOLUTION INTRAVENOUS at 22:16

## 2019-05-20 RX ADMIN — SODIUM CHLORIDE, SODIUM LACTATE, POTASSIUM CHLORIDE, AND CALCIUM CHLORIDE 150 ML/HR: 600; 310; 30; 20 INJECTION, SOLUTION INTRAVENOUS at 14:00

## 2019-05-20 NOTE — PROGRESS NOTES
Internal Medicine Progress Note NAME: Stephanie Alvarado :  1950 MRM:  [id number] Date/Time: 2019 ASSESSMENT/PLAN: 
 
 
# Admitted with  Sepsis ([facility] 75.) (2019) with GB Strep bacteremia. ID consulted. Source is leg cellulitis. Per ID, change to cefazolin. Repeat bcx pending, if negative can de-escalate to PO keflex to complete 7-10 days abx.  
  
# Dysphagia - MBS to be completed today, pending #  Altered mental state (2019). Unclear etio. Likely toxic -  metabolic encephalopathy. CT head, ammonia level negative. Neuro-observation.  
- improved mentation. Back to normal 
 
 # Wrist pain, acute, left (2019). swelling and tender. Ortho consulted. No fluid to aspirate. Ortho said no need for intervention, signed off. #  Dehydration (2019). Aggressive IVF # Sinus tachycardia. Bolus IVF. TTE. TSH/FT4 normal , slight low T3 . Cardiology consulted 
  
# Rhabdo. Elevated CPK. IVF . Improving CK level with hydration. Follow  
  
 # Ischemic Hepatitis presented with Liver failure, ho liver cirrhoses follow with hepatologist . GI consulted. Hydration. Follow labs. Check PT/INR.  
 - improving AST/ALT # Coagulopathy due to liver disease. # Liver cancer ([facility] (2019). Will check AFP. GI on board 
  
#  HTN (hypertension) (2019). Control BP 
  
#  Falls. PT/OT when stable 
  
# TCP. ? Liver disease related # Hypoalbuminemia # Musculoskeletal pains from fals. Pain control #  Acute non-occlusive thrombus present in 1 of 2 right posterior tibial vein(s). His INR elevated.  
  
# APS involved. Lab Review:  
 
Recent Labs  
  19 
0410 19 
0230 19 
0220 WBC 5.7 6.1 5.8 HGB 8.2* 10.3* 9.0*  
HCT 27.2* 32.8* 28.6* PLT 93* 90* 79* Recent Labs  
  19 
0410 19 
0230 19 
4025 19 
9836  142  --  141  
K 4.3 3.8  --  3.5 * 109*  --  108 CO2 24 22  --  26 * 165*  --  64* BUN 16 22*  --  16  
CREA 0.48* 0.71  --  0.44* CA 7.9* 8.0*  --  7.6*  
MG 2.0 2.1  --  2.0 PHOS 2.2* 1.9*  --  3.2 ALB 1.5* 1.7* 1.8* 1.8* TBILI 1.6* 1.5* 1.7* 2.0*  
SGOT 1,320* 3,305* 6,539* 8,575* ALT 1,968* 3,140* 4,064* 4,178* INR 1.3* 1.5*  --  1.7* Lab Results Component Value Date/Time Glucose (POC) 83 05/18/2019 08:49 AM  
 
No results for input(s): PH, PCO2, PO2, HCO3, FIO2 in the last 72 hours. Recent Labs  
  05/20/19 
0410 05/19/19 
0230 05/18/19 
0220 INR 1.3* 1.5* 1.7* No results found for: SDES Lab Results Component Value Date/Time Culture result: (A) 05/17/2019 12:35 PM  
  AEROBIC AND ANAEROBIC BOTTLES STREPTOCOCCI, BETA HEMOLYTIC GROUP B Culture result: (A) 05/17/2019 12:20 PM  
  AEROBIC AND ANAEROBIC BOTTLES STREPTOCOCCI, BETA HEMOLYTIC GROUP B Culture result: NO GROWTH 6 DAYS 03/28/2018 08:00 PM  
 
 
All Cardiac Markers in the last 24 hours:  
Lab Results Component Value Date/Time  05/20/2019 04:10 AM  
 
ABG: No results found for: PH, PHI, PCO2, PCO2I, PO2, PO2I, HCO3, HCO3I, FIO2, FIO2I Liver Panel:  
Lab Results Component Value Date/Time ALB 1.5 (L) 05/20/2019 04:10 AM  
 TP 5.0 (L) 05/20/2019 04:10 AM  
 GLOB 3.5 05/20/2019 04:10 AM  
 AGRAT 0.4 (L) 05/20/2019 04:10 AM  
 SGOT 1,320 (H) 05/20/2019 04:10 AM  
 ALT 1,968 (H) 05/20/2019 04:10 AM  
  (H) 05/20/2019 04:10 AM  
 
 
 
  
Subjective: Chief Complaint:     
Pt said he feels better. Denies CP. Objective:  
 
Vitals: 
Last 24hrs VS reviewed since prior progress note. Most recent are: 
 
Visit Vitals /71 Pulse (!) 123 Temp 98.2 °F (36.8 °C) Resp 24 Ht 5' 6\" (1.676 m) Wt 62.6 kg (138 lb) SpO2 95% BMI 22.27 kg/m² SpO2 Readings from Last 6 Encounters:  
05/20/19 95% 03/04/19 100% 10/03/18 96% 03/28/18 95% O2 Flow Rate (L/min): 4 l/min Intake/Output Summary (Last 24 hours) at 5/20/2019 1204 Last data filed at 5/20/2019 1000 Gross per 24 hour Intake 4420 ml Output 725 ml Net 3695 ml Physical Exam:  
Gen:   Appear stated age, Well-developed,   in no acute distress HEENT:  Head atraumatic, normocephalic , hearing intact to voice, dry mucous membranes. Neck:  Supple, no masses I appreciate, thyroid non-tender. Resp:  No accessory muscle use,Bilateral BS present, clear breath sounds without wheezes rales or rhonchi 
Card:  No murmurs, normal S1, S2 without thrills, bruits. +2-3  Leg peripheral edema. Abd:  Soft, non-tender, non-distended, normoactive bowel sounds are present, no palpable organomegaly Musc: L wrist swelling tender,  No cyanosis or clubbing. Skin: multiple skin abrasions and small ulcers indicate falls,  No rashes   skin turgor is good. Neuro: Alert,    Cranial nerves are grossly intact, no clear area of focal motor weakness, can follows commands appropriately Telemetry reviewed:   normal sinus rhythm Medications Reviewed: (see below) Lab Data Reviewed: (see below) 
 
______________________________________________________________________ Medications:  
 
Current Facility-Administered Medications Medication Dose Route Frequency  ceFAZolin (ANCEF) 1 g in 0.9% sodium chloride (MBP/ADV) 50 mL MBP  1 g IntraVENous Q8H  
 barium sulfate (E-Z-DISK) contrast tablet 700 mg  700 mg Oral RAD ONCE  
 magic mouthwash  10 mL Oral Q6H PRN  polyethylene glycol (MIRALAX) packet 17 g  17 g Oral DAILY PRN  
 bisacodyl (DULCOLAX) tablet 10 mg  10 mg Oral DAILY PRN  
 traMADol (ULTRAM) tablet 50 mg  50 mg Oral DAILY PRN  
 traZODone (DESYREL) tablet 25 mg  25 mg Oral QHS PRN  
 albuterol-ipratropium (DUO-NEB) 2.5 MG-0.5 MG/3 ML  3 mL Nebulization Q6H PRN  
 methylPREDNISolone (PF) (SOLU-MEDROL) injection 4 mg  4 mg IntraVENous ACB&D  
 glucose chewable tablet 16 g  4 Tab Oral PRN  
 glucagon (GLUCAGEN) injection 1 mg  1 mg IntraMUSCular PRN  
  dextrose (D50) infusion 12.5-25 g  25-50 mL IntraVENous PRN  
 HYDROmorphone (DILAUDID) syringe 0.5 mg  0.5 mg IntraVENous Q4H PRN  
 nicotine (NICODERM CQ) 21 mg/24 hr patch 1 Patch  1 Patch TransDERmal DAILY  sodium chloride (NS) flush 5-10 mL  5-10 mL IntraVENous PRN  
 sodium chloride (NS) flush 5-10 mL  5-10 mL IntraVENous PRN  
 lactated Ringers infusion  150 mL/hr IntraVENous CONTINUOUS Total time spent with patient: 35 minutes Care Plan discussed with: Patient, Nursing Staff, Consultant/Specialist and >50% of time spent in counseling and coordination of care  GI Discussed:  Care Plan Prophylaxis:  SCD's Disposition:  TD Attending Physician: Chiara Trinh MD

## 2019-05-20 NOTE — PROGRESS NOTES
PT orders received and chart reviewed. Patient in process of transferring to . Patient HR currently in 130s at rest. Will hold PT evaluation and f/u with patient when appropriate. Thank you for this referral, Fredis Breaux PT, DPT Office extension: [phone] Pager #: [phone]

## 2019-05-20 NOTE — PROGRESS NOTES
In chart to take critical result from lab as patient rn is with another patient. 0507--in chart to document critical labs for rn.

## 2019-05-20 NOTE — WOUND CARE
Wound/Ostomy Nurse Progress Note Patient: Eric Croft [facility]:[birth year] MRN: [id number] Situation: wound consult for BLE wounds/ cellulitis and sacral wound. Background: pt admitted for sepsis and cellulitis of BLE. Currently receiving antibiotics. Assessment: scattered wounds to BLE w/ serous drainage and yellow bases. Legs weeping,  R dressing soaked when removed. periwound macerated. Heels intact and blanchable. Recommendation: cleanse BLE w/ wound cleanser and pat gently dry. Apply aquacel ag to open areas and cover w/ abdominal pads. Wrap w/ kerlex and change every other day or PRN is dressing becomes soaked. Small DTI 1.3x1 cm discovered to perianal area. Offload DTI and keep open to air. 05/20/19 1549 Wound Pretibial Right;Lateral edges outlined 05/17/19 Date First Assessed: 05/17/19   Present on Hospital Admission: Yes  Primary Wound Type: (c) Other (comment)  Location: Pretibial  Wound Location Orientation: Right;Lateral  Wound Description: edges outlined  Date of First Observation: 05/17/19 Dressing Status Removed Dressing Type 4 x 4;ABD pad;Gauze wrap (dario) Non-staged Wound Description Full thickness Wound Length (cm) 2.4 cm Wound Width (cm) 2.1 cm Wound Depth (cm) 0.1 cm Wound Volume (cm^3) 0.5 cm^3 Condition of [facility] Assessment Drainage;Pale;Pink;Painful Tissue Type Percent Yellow 100 Drainage Color Serous Wound Odor None Erika-wound Assessment Maceration Cleansing and Cleansing Agents  Dermal wound cleanser Dressing Changed Changed/New Dressing Type Applied Other (Comment) (aquacel ag, ad pad, kerlex) Procedure Tolerated Well Wound Leg lower Left; Outer; Lower large shallow opening to lateral lower leg; weeping present 05/17/19 Date First Assessed: 05/17/19   Present on Hospital Admission: Yes  Primary Wound Type: (c) Other (comment)  Location: Leg lower  Wound Location Orientation: Left; Outer; Lower  Wound Description: large shallow opening to lateral lower leg; weeping pres. .. Dressing Status Removed Dressing Type ABD pad;Gauze;Gauze wrap (dario) Wound Length (cm) 8.4 cm Wound Width (cm) 7.1 cm Assessment Drainage;Pale;Painful; Other (Comment) (yellow) Tissue Type Percent Pink 20 Tissue Type Percent Yellow 80 Drainage Amount Moderate Drainage Color Serous Wound Odor None Erika-wound Assessment Maceration Margins Attached edges Cleansing and Cleansing Agents  Dermal wound cleanser Dressing Changed Changed/New Dressing Type Applied Other (Comment) (aquacel ag, ab pad, kerlex) Wound Buttocks deep tissue injury Date First Assessed/Time First Assessed: 05/20/19 1445   Primary Wound Type: Pressure Injury  Location: Buttocks  Wound Description: deep tissue injury Dressing Status Dry Dressing Type Open to air Pressure Injury Deep Tissue Injury Wound Length (cm) 1.3 cm Wound Width (cm) 1 cm Tissue Type Percent Maroon/Purple 100 % Cleansing and Cleansing Agents  Dermal wound cleanser

## 2019-05-20 NOTE — PROGRESS NOTES
PT screen received and chart reviewed. Per chart review of admitting diagnosis and relevant prior level of function formal PT/OT evaluations are NOT indicated. PT/OT evaluation is NOT indicated. Thank you, Devon Davis PT, DPT Office Extension: [phone] Pager: [phone]

## 2019-05-20 NOTE — PROGRESS NOTES
Assumed care of patient resting in bed, report received from off- going nurse, Luis Esteban, [facility]. No complaints noted at this time. HOB elevated, call bell in reach. Will continue to monitor. 1125: call received APS, Jaky Lee, update given at this time. 1251: TRANSFER - OUT REPORT: 
 
Verbal report given to REDD Lora (name) on Rolanda Varner  being transferred to [facility] ([facility]) for routine progression of care Report consisted of patients Situation, Background, Assessment and  
Recommendations(SBAR). Information from the following report(s) SBAR, Kardex, ED Summary, Procedure Summary, Intake/Output, MAR, Recent Results, Cardiac Rhythm sinus tach and Alarm Parameters  was reviewed with the receiving nurse. Lines:  
Peripheral IV 05/17/19 Right Antecubital (Active) Site Assessment Clean, dry, & intact 5/20/2019 10:00 AM  
Phlebitis Assessment 0 5/20/2019 10:00 AM  
Infiltration Assessment 0 5/20/2019 10:00 AM  
Dressing Status Clean, dry, & intact 5/20/2019  8:00 AM  
Dressing Type Transparent;Tape 5/20/2019  8:00 AM  
Hub Color/Line Status Pink;Patent 5/20/2019  8:00 AM  
Action Taken Open ports on tubing capped 5/20/2019  8:00 AM  
Alcohol Cap Used Yes 5/20/2019  8:00 AM  
   
Peripheral IV 05/18/19 Anterior;Left;Proximal Forearm (Active) Site Assessment Clean, dry, & intact 5/20/2019 10:00 AM  
Phlebitis Assessment 0 5/20/2019 10:00 AM  
Infiltration Assessment 0 5/20/2019 10:00 AM  
Dressing Status Clean, dry, & intact 5/20/2019  8:00 AM  
Dressing Type Transparent;Tape 5/20/2019  8:00 AM  
Hub Color/Line Status Pink;Patent 5/20/2019  8:00 AM  
Action Taken Open ports on tubing capped 5/20/2019  8:00 AM  
Alcohol Cap Used Yes 5/20/2019  8:00 AM  
  
 
Opportunity for questions and clarification was provided. Patient transported with: 
 Registered Nurse And all home belongings ( including glasses, wallet- no money, cell phone, dentures, hearing aid to left ear, bible, and clothing) Attending made aware of NPO status from barium swallow.

## 2019-05-20 NOTE — PROGRESS NOTES
Problem: Dysphagia (Adult) Goal: *Acute Goals and Plan of Care (Insert Text) Description Patient will: 1. Tolerate puree diet with nectar thick liquids without overt s/sx of aspiration in 4/5 trials under SLP supervision. 2. Tolerate diet upgrade without overt s/sx of aspiration under SLP supervision. 3. Utilize compensatory swallow strategies of small bite/sip, alternate liquid/solid with min cues in 4/5 trials. 4. Complete an objective swallow study (i.e., MBSS) to assess swallow integrity, r/o aspiration, and determine of safest LRD, min A. 
 
Rec:  
NPO with PEG vs Comfort Care Diet (puree/thin) Aspiration precautions Oral care TID Outcome: Not Progressing Towards Goal 
 
SPEECH PATHOLOGY MODIFIED BARIUM SWALLOW STUDY & TREATMENT Patient: Maryjane Luu (75 y.o. male) Date: 5/20/2019 Primary Diagnosis: Sepsis ([facility] [address] [A41.9] Precautions: aspiration PLOF: regular diet ASSESSMENT : 
MBS completed with SILENT aspiration across all trials, to include: thin, nectar-thick liquids, honey-thick liquids, and puree solids. Consistent silent aspiration exhibited after swallow on large volume residuals in valleculae and pyriforms. Deficits exhibited included ineffective oral bolus prep and transit with premature spillage into valleculae. Swallow delay of ~2 second consistently exhibited. Minimal hyolaryngeal excursion resulting in airway compromise, absent epiglottic inversion, and large volume residuals in valleculae and pyriforms. Thus increasing pt risk for aspiration. Pt presents with moderately-severe oropharyngeal dysphagia, as evidenced above, which places pt at high risk for aspiration despite diet modification to puree with honey-thick liquids. Rec QOL/comfort diet vs PEG; however, given multiple comorbidities, preferred recommendation is initiation of QOL/comfort diet (puree/thin liquids; meds crushed).  SLP recommending Palliative consult to address nutritional needs/offering. Treatment: 
Skilled therapy initiated: Educated pt on MBS results, aspiration precautions, and importance of determining safest, least restrictive diet; verbalized comprehension but requires reinforcement. SLP to follow as indicated. Patient will benefit from skilled intervention to address the above impairments. Patient's rehabilitation potential is considered to be Fair Factors which may influence rehabilitation potential include:  
? None noted ? Mental ability/status ? Medical condition ? Home/family situation and support systems ? Safety awareness ? Pain tolerance/management ? Other: PLAN : 
Recommendations and Planned Interventions: NPO w/PEG vs Comfort Care diet Frequency/Duration: Patient will be followed by speech-language pathology 3-5 times a week to address goals. Discharge Recommendations: José Luis Jacobs SUBJECTIVE:  
Patient stated ? I didn't get to use my magic mouthwash this morning? . 
 
OBJECTIVE:  
 
Past Medical History:  
Diagnosis Date Cancer ([facility] [address] Hypertension History reviewed. No pertinent surgical history. Home Situation:  
Home Situation Home Environment: Private residence One/Two Story Residence: One story Living Alone: No 
Support Systems: Family member(s), Friends \ neighbors Patient Expects to be Discharged to[de-identified] Private residence Current DME Used/Available at Home: None Diet prior to admission: regular/thin Current Diet:  NPO w/PEG vs puree/thin; meds crushed comfort care diet Radiologist: [facility] Film Views: Lateral 
Patient Position: 90 
 
8-point Penetration-Aspiration Scale: Score 8 PAIN: 
Pain level pre-treatment: 0/10 Pain level post-treatment: 0/10 COMMUNICATION/EDUCATION:  
?  Patient educated regarding MBS results and diet recommendations. ?  Patient/family have participated as able in goal setting and plan of care. ?  Patient/family agree to work toward stated goals and plan of care. ?  Patient understands intent and goals of therapy, but is neutral about his/her participation. ? Patient is unable to participate in goal setting and plan of care. Thank you for this referral. 
Lowery Najjar, SLP Time Calculation: 30 mins MBS Time: 20 minutes Treatment Time: 10 minutes

## 2019-05-20 NOTE — PROGRESS NOTES
Problem: Pressure Injury - Risk of 
Goal: *Prevention of pressure injury Description Document Moustapha Scale and appropriate interventions in the flowsheet. Outcome: Progressing Towards Goal 
  
Problem: Patient Education: Go to Patient Education Activity Goal: Patient/Family Education Outcome: Progressing Towards Goal 
  
Problem: General Medical Care Plan Goal: *Vital signs within specified parameters Outcome: Progressing Towards Goal 
Goal: *Labs within defined limits Outcome: Progressing Towards Goal 
Goal: *Absence of infection signs and symptoms Outcome: Progressing Towards Goal 
Goal: *Optimal pain control at patient's stated goal 
Outcome: Progressing Towards Goal 
Goal: *Skin integrity maintained Outcome: Progressing Towards Goal 
Goal: *Fluid volume balance Outcome: Progressing Towards Goal 
Goal: *Optimize nutritional status Outcome: Progressing Towards Goal 
Goal: *Anxiety reduced or absent Outcome: Progressing Towards Goal 
Goal: *Progressive mobility and function (eg: ADL's) Outcome: Progressing Towards Goal 
  
Problem: Pain Goal: *Control of Pain Outcome: Progressing Towards Goal 
Goal: *PALLIATIVE CARE:  Alleviation of Pain Outcome: Progressing Towards Goal 
  
Problem: Infection - Risk of, Urinary Catheter-Associated Urinary Tract Infection Goal: *Absence of infection signs and symptoms Outcome: Progressing Towards Goal 
  
Problem: Falls - Risk of 
Goal: *Absence of Falls Description Document Curtis Roman Fall Risk and appropriate interventions in the flowsheet. Outcome: Progressing Towards Goal 
  
Problem: Patient Education: Go to Patient Education Activity Goal: Patient/Family Education Outcome: Progressing Towards Goal 
  
Problem: Discharge Planning Goal: *Discharge to safe environment Outcome: Progressing Towards Goal 
  
Problem: Patient Education: Go to Patient Education Activity Goal: Patient/Family Education Outcome: Progressing Towards Goal

## 2019-05-20 NOTE — PROGRESS NOTES
Physical Exam  
Skin: Ecchymosis and petechiae noted.   
 
  
skin assessed with off-going nurse, Leda Calle RN

## 2019-05-20 NOTE — PROGRESS NOTES
Infectious Disease Follow-up Note Date of Admission: 5/17/2019     Date of Note:  5/20/2019 Summary:  
 
67 y/o CM w/ hepatocellular carcinoma s/p embolization on oral chem admitted w/ encephalopathy, fall, re-injure dislocated left wrist, shock liver, new + BSI Streptococcus. H/o treated hepatitis C, h/o portal vein thrombosis. Interval History:  
 
 Feels \"better\". Less leg and back pain. Has appetite now - hungry since NPO for US being done at bedside at present. Afebrile Current Antimicrobials: Prior Antimicrobials Cefazolin 5/20 - 0   abx 5/18 - 2 Vanc, zosyn levofloxacin 5/17 - 0 Vancomycin 5/18 - 2  
  
Assessment / Plan:  
  
NEW GB Streptococcus BSI 
- 2 of 2 blcx + 5/17 
- suspect from leg cellulitis -> change Vancomycin to Cefazolin 1 gm IV q 8 hours 
-> repeat blcx x 2 
-> de-escalate to po Cephalexin soon to complete 7-10 days abx Bilateral LE cellulitis 
- from infected abrasions following fall 
- improving -> abx as above Sepsis 
- fever, tachycardia, bandemia present on admission 
- from above cellulitis / BSI plus inflammation related to left wrist dislocation, RLE non-occlusive DVT 
- fever, bandemia resolving, still tachycardic -> abx as above Elevated Transaminases 
- suspect shock liver  
- improved -> per GI Fall 
- re-injured left wrist (previously injured 1 week PTA) -> per Ortho Hepatocellular CA s/p embolizaton    
h/o treated Hepatitis C    
h/o portal vein thombosis    
  
Microbiology:  
5/17 blcx GBS x 2 
  
Lines / Catheters:  
  
 piv Patient Active Problem List  
Diagnosis Code  Altered mental state R41.82  Sepsis ([address] A41.9  Wrist pain, acute, left M25.532  Dehydration E86.0  Liver failure (HCC) K72.90  Liver cancer ([address] C22.9  
 HTN (hypertension) I10  
 Fall W19. Shahram Tolliver Current Facility-Administered Medications Medication Dose Route Frequency  magic mouthwash  10 mL Oral Q6H PRN  
  polyethylene glycol (MIRALAX) packet 17 g  17 g Oral DAILY PRN  
 bisacodyl (DULCOLAX) tablet 10 mg  10 mg Oral DAILY PRN  
 traMADol (ULTRAM) tablet 50 mg  50 mg Oral DAILY PRN  
 traZODone (DESYREL) tablet 25 mg  25 mg Oral QHS PRN  
 albuterol-ipratropium (DUO-NEB) 2.5 MG-0.5 MG/3 ML  3 mL Nebulization Q6H PRN  
 methylPREDNISolone (PF) (SOLU-MEDROL) injection 4 mg  4 mg IntraVENous ACB&D  
 vancomycin (VANCOCIN) 1,250 mg in 0.9% sodium chloride 250 mL IVPB  1,250 mg IntraVENous Q12H  
 [START ON 2019] VANCOMYCIN INFORMATION NOTE   Other ONCE  
 glucose chewable tablet 16 g  4 Tab Oral PRN  
 glucagon (GLUCAGEN) injection 1 mg  1 mg IntraMUSCular PRN  
 dextrose (D50) infusion 12.5-25 g  25-50 mL IntraVENous PRN  
 HYDROmorphone (DILAUDID) syringe 0.5 mg  0.5 mg IntraVENous Q4H PRN  
 nicotine (NICODERM CQ) 21 mg/24 hr patch 1 Patch  1 Patch TransDERmal DAILY  sodium chloride (NS) flush 5-10 mL  5-10 mL IntraVENous PRN  
 sodium chloride (NS) flush 5-10 mL  5-10 mL IntraVENous PRN  
 lactated Ringers infusion  150 mL/hr IntraVENous CONTINUOUS  
 VANCOMYCIN INFORMATION NOTE   Other Rx Dosing/Monitoring  VANCOMYCIN INFORMATION NOTE   Other ONCE Review of Systems Constitutional: Negative for fever. HENT: Negative. Eyes: Negative. Respiratory: Negative. Cardiovascular: Negative. Gastrointestinal: Negative. Genitourinary: Negative. Musculoskeletal: Positive for back pain. Neurological: Negative. Endo/Heme/Allergies: Negative. Psychiatric/Behavioral: Negative. Objective:  
 
Visit Vitals /60 (BP 1 Location: Left arm) Pulse (!) 108 Temp 97.1 °F (36.2 °C) Resp 20 Ht 5' 6\" (1.676 m) Wt 62.6 kg (138 lb) SpO2 91% BMI 22.27 kg/m² Temp (24hrs), Av.5 °F (36.9 °C), Min:97.1 °F (36.2 °C), Max:99.5 °F (37.5 °C) 
 
  
General: Well developed, well nourished 68 y.o.  male in no acute distress. HEENT: no scleral icterus, no oral lesions Neck: supple, symmetrical, trachea midline Cardio:  regular rate and rhythm, S1, S2 normal, no murmur, click, rub or gallop Lungs: no wheezes or rales Abdomen: soft, RUQ tender, Liver palpable around 8 cm below Critical access hospital Extremities:  bilateral legs/ankles enclosed in kerlix dressings. Exposed feet improved erythema Lab results Chemistry Recent Labs  
  05/20/19 
0410 05/19/19 
0230 05/18/19 
1430 05/18/19 
2171 * 165*  --  64*  142  --  141  
K 4.3 3.8  --  3.5 * 109*  --  108 CO2 24 22  --  26 BUN 16 22*  --  16  
CREA 0.48* 0.71  --  0.44* CA 7.9* 8.0*  --  7.6* AGAP 8 11  --  7  
BUCR 33* 31*  --  36* * 715* 778* 771* TP 5.0* 5.3* 5.2* 5.2* ALB 1.5* 1.7* 1.8* 1.8*  
GLOB 3.5 3.6 3.4 3.4 AGRAT 0.4* 0.5* 0.5* 0.5* CBC w/ Diff Recent Labs  
  05/20/19 
0410 05/19/19 
0230 05/18/19 
0220 WBC 5.7 6.1 5.8  
RBC 3.33* 4.00* 3.53* HGB 8.2* 10.3* 9.0*  
HCT 27.2* 32.8* 28.6* PLT 93* 90* 79* GRANS 80* 72 92* LYMPH 2* 2* 5* EOS 0 0 0 Microbiology All Micro Results Procedure Component Value Units Date/Time CULTURE, BLOOD [[id number]] Order Status:  Sent Specimen:  Whole Blood CULTURE, BLOOD [[id number]] Order Status:  Sent Specimen:  Whole Blood CULTURE, BLOOD [[id number]]  (Abnormal)  (Susceptibility) Collected:  05/17/19 1220 Order Status:  Completed Specimen:  Blood Updated:  05/19/19 4202 Special Requests: PERIPHERAL     
  GRAM STAIN    
  GRAM POSITIVE COCCI IN PAIRS IN CHAINS AEROBIC AND ANAEROBIC BOTTLES  
     
      
  SMEAR CALLED TO AND CORRECTLY REPEATED BY: REDD Givens ON [id number] AT 2024 TO LILLIAN Culture result:    
  AEROBIC AND ANAEROBIC BOTTLES STREPTOCOCCI, BETA HEMOLYTIC GROUP B  
     
 CULTURE, BLOOD [[id number]]  (Abnormal) Collected:  05/17/19 1235 Order Status:  Completed Specimen:  Blood Updated:  05/19/19 0731 Special Requests: PERIPHERAL     
  GRAM STAIN    
  GRAM POSITIVE COCCI IN PAIRS IN CHAINS AEROBIC AND ANAEROBIC BOTTLES  
     
      
  SMEAR CALLED TO AND CORRECTLY REPEATED BY: REDD Johns May ON [id number] AT 2217 TO LILLIAN Aerobic bottle called to [address] [place] [address] 260 on 05/18/19 at 1020 by Bharat Callahan  
  Culture result:    
  AEROBIC AND ANAEROBIC BOTTLES STREPTOCOCCI, BETA HEMOLYTIC GROUP B  
     
 CULTURE, BODY FLUID W Hortensia Wright [[id number]] Order Status:  Sent Specimen:  Synovial Fluid Ismael Baez [[id number]] Order Status:  Sent Specimen: Body fld Alia Santiago MD, Lamont Daniels Infectious Disease Specialist 
Pager [phone]

## 2019-05-20 NOTE — PROGRESS NOTES
Speech Therapy Note: MBS completed with silent aspiration across all trials: thin, nectar, honey, pudding. Study terminated after pudding trial. Aspiration occurred consistently after swallow on large volume residuals in valleculae. Chin tuck only temporarily resolved aspiration for 1-2 swallows, no subsequent benefit. Pt is at very high risk for aspiration despite diet modification to puree with honey-thick liquids. Rec QOL/comfort diet vs PEG; however, given multiple comorbidities, preferred recommendation is initiation of QOL/comfort diet of thin and puree. May benefit from Palliative consult to address nutritional needs. Full report to follow. Hannah Johansen M.S., CCC-SLP Clinical Supervisor - Med-surg Rehab Ph: [phone]

## 2019-05-20 NOTE — PROGRESS NOTES
[facility]  -  Progress Note Patient: Homero Zambrano MRN: [id number]  SSN: [id number] YOB: 1950  Age: 68 y.o. Sex: male Admit Date: 5/17/2019 I saw, evaluated, interviewed and examined the patient personally. I agree with the findings and plan of care as documented below with PA-C note Patient with lower extremity cellulitis and sepsis, ID following with IV antibiotics Acute liver injury in setting of hepatocellular carcinoma and questionable ischemic hepatitis. GI managing. LFTs are improving Echo with EF 6570%. Patient with blood pressure in 299005 systolic. Tachycardia ranging from 90 up to 130 in the setting of sepsis. Currently heart rate 105 bpm.  Most likely physiologic from underlying stress and acute conditions No further cardiac work up planned at this time unless clinical status changes. Please Call us back if needed. Will be available as needed. Thank you. Jozef Tate MD 
 
 
 
Assessment:  
 
-Sent by APS due to falls and incontinence. -Sepsis, BCx 5/17/19 + for beta hemolytic group B streptococci, ID following  
-Hepatocellular carcinoma, CT abdomen 5/17/19: persistent cirrhotic appearance of the liver with ill-defined hypoattenuation in the right lobe which may represent pt's treated hepatocellular carcinoma. There is extensive new hypoattenuation involving the left lobe without obvious etiology. Cannot exclude mass on the vascular compromise related to portal vein thrombosis potentially cause this appearance over the left portal vein does not appear patent. There is thrombus at the bifurcation of the main portal vein with complete occlusion of the right portal vein. 
-Possible new RLL pulmonary nodule with small area of RLL atelectasis or infiltrate. 
-Mildly elevated troponin, no chest pain or ischemic EKG changes. Likely demand ischemia in setting of sepsis. -Echo 5/19/19: EF 66-70% -Hypoalbuminemia 
-Anemia Plan: -HR up to ~140 yesterday, currently down to low 100's in setting of sepsis/bacteremia. May consider low-dose Metoprolol with holding parameters. Continue treatment of underlying infectious process per ID team. 
-No further cardiac workup planned. Subjective: No new complaints. Objective:  
  
Patient Vitals for the past 8 hrs: 
 Temp Pulse Resp BP SpO2  
05/20/19 0700  (!) 108 20 122/60 91 % 05/20/19 0600  (!) 112 17 111/61 98 % 05/20/19 0500  (!) 116 18 101/61 97 % 05/20/19 0400 97.1 °F (36.2 °C) (!) 115 18 121/67 95 % 05/20/19 0300  (!) 126 21 130/81 92 % 05/20/19 0200  (!) 118 (!) 35 102/54 94 % Patient Vitals for the past 96 hrs: 
 Weight 05/19/19 0914 138 lb (62.6 kg) 05/17/19 1637 138 lb 3.7 oz (62.7 kg) 05/17/19 1159 125 lb (56.7 kg) Intake/Output Summary (Last 24 hours) at 5/20/2019 6901 Last data filed at 5/20/2019 0700 Gross per 24 hour Intake 4170 ml Output 835 ml Net 3335 ml Physical Exam: 
General:  alert, cooperative, no distress, appears stated age Neck:  No JVD Lungs:  clear to auscultation bilaterally to anterolateral lung fields Heart:  Slightly tachycardic regular rhythm Abdomen:  abdomen is soft without significant tenderness, masses, organomegaly or guarding Extremities:  Ecchymoses to upper extremities, no appreciable edema Data Review:  
 
Labs: Results:  
   
Chemistry Recent Labs  
  05/20/19 
0410 05/19/19 
0230 05/18/19 
2174 05/18/19 
1809 * 165*  --  64*  142  --  141  
K 4.3 3.8  --  3.5 * 109*  --  108 CO2 24 22  --  26 BUN 16 22*  --  16  
CREA 0.48* 0.71  --  0.44* CA 7.9* 8.0*  --  7.6*  
MG 2.0 2.1  --  2.0 PHOS 2.2* 1.9*  --  3.2 AGAP 8 11  --  7  
BUCR 33* 31*  --  36* * 715* 778* 771* TP 5.0* 5.3* 5.2* 5.2* ALB 1.5* 1.7* 1.8* 1.8*  
GLOB 3.5 3.6 3.4 3.4 AGRAT 0.4* 0.5* 0.5* 0.5* CBC w/Diff Recent Labs  
  05/20/19 
0410 05/19/19 
0230 05/18/19 0220  
WBC 5.7 6.1 5.8  
RBC 3.33* 4.00* 3.53* HGB 8.2* 10.3* 9.0*  
HCT 27.2* 32.8* 28.6* PLT 93* 90* 79* GRANS 80* 72 92* LYMPH 2* 2* 5* EOS 0 0 0 Cardiac Enzymes Lab Results Component Value Date/Time  05/20/2019 04:10 AM  
  
Coagulation Recent Labs  
  05/20/19 
0410 05/19/19 
0230 PTP 16.2* 17.8* INR 1.3* 1.5* Liver Enzymes Recent Labs  
  05/20/19 
0410 TP 5.0* ALB 1.5* * SGOT 1,320* Thyroid Studies Lab Results Component Value Date/Time  T4, Total 4.8 05/19/2019 06:50 AM  
 TSH 1.19 05/19/2019 06:50 AM

## 2019-05-20 NOTE — PROGRESS NOTES
Discharge/Transition Planning Problem: Discharge Planning Goal: *Discharge to safe environment Outcome: Resolved/Met Hospice Spoke with pt about planning. He states he is going with Hospice to home that his wife went to for Hospice and they have accepted him. He states he believes name is Trevor Izaguirre. He will verify name when son in law calls today and get phone and address of home 
 
 
1600: Received message with Kylie Judy Frausto # is [phone] and address [address] [place] [address], [facility], [address] [place] [address]. Message states she would arrange transport. Will need to verify Solomon Cruz RN BSN Outcomes Manager Pager # [phone]

## 2019-05-20 NOTE — PROGRESS NOTES
Gastrointestinal Progress Note Patient Name: Akosua Chen Today's Date: 5/20/2019 Admit Date: 5/17/2019 Assessment:  
1) Acute liver injury with AST>ALT 8740/3237; aminotransferases steadily falling and overall pattern most consistent with ischemic hepatitis 2) Persistent tachycardia; etiology unclear: infection, pain, beta blocker withdrawal (appears he was on toprol XL), adrenal insufficiency (he tells me he was on a chronic steroid?), PE 
3) Liver cancer, s/p prior treatments over the past several years, currently off therapy with progression of disease suggested by recent CT scan 4) Cirrhosis, presumably due to hepatitis C treated in the past 
5) Recent falls, details unclear; ?period of time down at home 6) Recent weakness 7) Right posterior tibial vein thrombosis 8) Elevated troponin without chest pain 9) Aspiration on speech evaluation 
  
  
Recommendation:  
-Awaiting doppler US of hepatic veins and artery which I have re-ordered 
-Monitor liver enzymes, INR, and mental status -Avoid hypotension 
-Agree with speech therapist recommendation for palliative care consult; he is at risk for aspiration but clinical benefit of PEG would likely be limited given his multiple comorbidities and liver cancer. Subjective:  
 
Akousa Chen is a 68 y.o. Male Male seen for  acute liver injury in the setting of cirrhosis and liver cancer. Has been persistently tachycardic but some improvement the past 24 hours. At risk for aspiration per speech evaluation. No abdominal pain. On vancomycin for strep in the blood Current Facility-Administered Medications Medication Dose Route Frequency  ceFAZolin (ANCEF) 1 g in 0.9% sodium chloride (MBP/ADV) 50 mL MBP  1 g IntraVENous Q8H  
 barium sulfate (E-Z-DISK) contrast tablet 700 mg  700 mg Oral RAD ONCE  
 magic mouthwash  10 mL Oral Q6H PRN  polyethylene glycol (MIRALAX) packet 17 g  17 g Oral DAILY PRN  
  bisacodyl (DULCOLAX) tablet 10 mg  10 mg Oral DAILY PRN  
 traMADol (ULTRAM) tablet 50 mg  50 mg Oral DAILY PRN  
 traZODone (DESYREL) tablet 25 mg  25 mg Oral QHS PRN  
 albuterol-ipratropium (DUO-NEB) 2.5 MG-0.5 MG/3 ML  3 mL Nebulization Q6H PRN  
 methylPREDNISolone (PF) (SOLU-MEDROL) injection 4 mg  4 mg IntraVENous ACB&D  
 glucose chewable tablet 16 g  4 Tab Oral PRN  
 glucagon (GLUCAGEN) injection 1 mg  1 mg IntraMUSCular PRN  
 dextrose (D50) infusion 12.5-25 g  25-50 mL IntraVENous PRN  
 HYDROmorphone (DILAUDID) syringe 0.5 mg  0.5 mg IntraVENous Q4H PRN  
 nicotine (NICODERM CQ) 21 mg/24 hr patch 1 Patch  1 Patch TransDERmal DAILY  sodium chloride (NS) flush 5-10 mL  5-10 mL IntraVENous PRN  
 sodium chloride (NS) flush 5-10 mL  5-10 mL IntraVENous PRN  
 lactated Ringers infusion  150 mL/hr IntraVENous CONTINUOUS Objective:  
 
Physical Exam: 
 
Visit Vitals /71 Pulse (!) 126 Temp 98.2 °F (36.8 °C) Resp 23 Ht 5' 6\" (1.676 m) Wt 62.6 kg (138 lb) SpO2 97% BMI 22.27 kg/m² Gen: Awake and alert CV: Regular, tachycardic Abd: Soft, nonttender without rebound/guarding.  
 
Data Review: 
 
Labs: Results:  
   
Chemistry Recent Labs  
  05/20/19 
0410 05/19/19 
0230 05/18/19 
6698 05/18/19 
5921 * 165*  --  64*  142  --  141  
K 4.3 3.8  --  3.5 * 109*  --  108 CO2 24 22  --  26 BUN 16 22*  --  16  
CREA 0.48* 0.71  --  0.44* CA 7.9* 8.0*  --  7.6* AGAP 8 11  --  7  
BUCR 33* 31*  --  36* * 715* 778* 771* TP 5.0* 5.3* 5.2* 5.2* ALB 1.5* 1.7* 1.8* 1.8*  
GLOB 3.5 3.6 3.4 3.4 AGRAT 0.4* 0.5* 0.5* 0.5* CBC w/Diff Recent Labs  
  05/20/19 0410 05/19/19 
0230 05/18/19 
0220 WBC 5.7 6.1 5.8  
RBC 3.33* 4.00* 3.53* HGB 8.2* 10.3* 9.0*  
HCT 27.2* 32.8* 28.6* PLT 93* 90* 79* GRANS 80* 72 92* LYMPH 2* 2* 5* EOS 0 0 0 Coagulation Recent Labs  
  05/20/19 0410 05/19/19 0230 PTP 16.2* 17.8*  
 INR 1.3* 1.5* Liver Enzymes Recent Labs  
  05/20/19 
0410 TP 5.0* ALB 1.5* * SGOT 1,320* ALT 1,968* Jl Paiz MD 
May 20, 2019

## 2019-05-20 NOTE — PROGRESS NOTES
Spoke to pt about speech recommendations which noted pt failed MBS study with overt aspiration. Pt states he is DNR and is planning on going to hospice on discharge. He said he wants to eat and said as long as he takes his magic mouthwash his swallowing will be okay. He refuses PEG tube. I explained risks of taking PO given failed MBS, including risk of aspiration, choking, respiratory failure, hypoxia and death. He expressed understanding of risks and wishes to proceed with taking PO. Changed order to DNR and ordered speech recommended diet -- dysphagia pureed with honey thick liquids.

## 2019-05-20 NOTE — ROUTINE PROCESS
0967 TRANSFER - IN REPORT: 
 
Verbal report received from Elio Weldon RN(name) on Kedar Varela  being received from ICU(unit) for routine progression of care Report consisted of patients Situation, Background, Assessment and  
Recommendations(SBAR). Information from the following report(s) SBAR and ED Summary was reviewed with the receiving nurse. Opportunity for questions and clarification was provided. Assessment completed upon patients arrival to unit and care assumed. Dual skin check completed with [facility] Florez catheter removed, condom catheter applied [id number] spoke to Dr Klaudia Tillman regarding plan for NPO status. Plan is for patient to be allowed to eat pureed diet and he understands he is aspirating but chooses to consume food at this time. Diet orders placed by Dr Klaudia Tillman. 1511 per Dr Klaudia Tillman he does want patient on cardiac monitoring. Box #8 applied, and verified, sinus tachycardia  
 
1649 Dr Klaudia Tillman made aware of elevated heart rate in the 130s and elevated MEWS score. No changes to IV fluids or any other interventions. Continue to monitor. Dr Klaudia Tillman states possibly will DC tele tomorrow. 1930 Bedside, Verbal and Written shift change report given to Dominick Cifuentes (oncoming nurse) by Geno RAINEY (offgoing nurse). Report included the following information SBAR and Kardex. Patient currently resting in bed, drowsy but alert and oriented to self, place, and time, denies pain or shortness of breath, call bell in reach, 5 Ps and hourly rounding completed, bed locked in low position

## 2019-05-21 LAB
CK SERPL-CCNC: 126 U/L (ref 39–308)
EST. AVERAGE GLUCOSE BLD GHB EST-MCNC: 137 MG/DL
GLUCOSE BLD STRIP.AUTO-MCNC: 122 MG/DL (ref 70–110)
GLUCOSE BLD STRIP.AUTO-MCNC: 129 MG/DL (ref 70–110)
GLUCOSE BLD STRIP.AUTO-MCNC: 147 MG/DL (ref 70–110)
GLUCOSE BLD STRIP.AUTO-MCNC: 158 MG/DL (ref 70–110)
HBA1C MFR BLD: 6.4 % (ref 4.2–5.6)
INR PPP: 1.3 (ref 0.8–1.2)
LACTATE SERPL-SCNC: 2.4 MMOL/L (ref 0.4–2)
PROTHROMBIN TIME: 16.4 SEC (ref 11.5–15.2)
T3 SERPL-MCNC: 49 NG/DL (ref 71–180)

## 2019-05-21 PROCEDURE — 85610 PROTHROMBIN TIME: CPT

## 2019-05-21 PROCEDURE — 82962 GLUCOSE BLOOD TEST: CPT

## 2019-05-21 PROCEDURE — 65660000000 HC RM CCU STEPDOWN

## 2019-05-21 PROCEDURE — 77030011254 HC DRSG HYDRGEL S&N -A

## 2019-05-21 PROCEDURE — 74011250637 HC RX REV CODE- 250/637: Performed by: INTERNAL MEDICINE

## 2019-05-21 PROCEDURE — 83605 ASSAY OF LACTIC ACID: CPT

## 2019-05-21 PROCEDURE — 36415 COLL VENOUS BLD VENIPUNCTURE: CPT

## 2019-05-21 PROCEDURE — 82550 ASSAY OF CK (CPK): CPT

## 2019-05-21 PROCEDURE — 74011000250 HC RX REV CODE- 250: Performed by: INTERNAL MEDICINE

## 2019-05-21 PROCEDURE — 74011250636 HC RX REV CODE- 250/636: Performed by: INTERNAL MEDICINE

## 2019-05-21 PROCEDURE — 77030037878 HC DRSG MEPILEX >48IN BORD MOLN -B

## 2019-05-21 PROCEDURE — 77030037877 HC DRSG MEPILEX >48IN BORD MOLN -A

## 2019-05-21 PROCEDURE — 77030010545

## 2019-05-21 PROCEDURE — 77010033678 HC OXYGEN DAILY

## 2019-05-21 PROCEDURE — 74011000258 HC RX REV CODE- 258: Performed by: INTERNAL MEDICINE

## 2019-05-21 RX ORDER — MAGNESIUM SULFATE 100 %
4 CRYSTALS MISCELLANEOUS AS NEEDED
Status: DISCONTINUED | OUTPATIENT
Start: 2019-05-21 | End: 2019-05-22 | Stop reason: HOSPADM

## 2019-05-21 RX ORDER — INSULIN LISPRO 100 [IU]/ML
INJECTION, SOLUTION INTRAVENOUS; SUBCUTANEOUS
Status: DISCONTINUED | OUTPATIENT
Start: 2019-05-21 | End: 2019-05-22 | Stop reason: HOSPADM

## 2019-05-21 RX ORDER — DEXTROSE MONOHYDRATE 25 G/50ML
25-50 INJECTION, SOLUTION INTRAVENOUS AS NEEDED
Status: DISCONTINUED | OUTPATIENT
Start: 2019-05-21 | End: 2019-05-22 | Stop reason: HOSPADM

## 2019-05-21 RX ADMIN — HYDROMORPHONE HYDROCHLORIDE 0.5 MG: 1 INJECTION, SOLUTION INTRAMUSCULAR; INTRAVENOUS; SUBCUTANEOUS at 14:29

## 2019-05-21 RX ADMIN — METHYLPREDNISOLONE SODIUM SUCCINATE 4 MG: 40 INJECTION, POWDER, FOR SOLUTION INTRAMUSCULAR; INTRAVENOUS at 23:23

## 2019-05-21 RX ADMIN — LIDOCAINE HYDROCHLORIDE 10 ML: 20 SOLUTION ORAL; TOPICAL at 12:25

## 2019-05-21 RX ADMIN — CEFAZOLIN 1 G: 1 INJECTION, POWDER, FOR SOLUTION INTRAMUSCULAR; INTRAVENOUS at 01:51

## 2019-05-21 RX ADMIN — METHYLPREDNISOLONE SODIUM SUCCINATE 4 MG: 40 INJECTION, POWDER, FOR SOLUTION INTRAMUSCULAR; INTRAVENOUS at 06:29

## 2019-05-21 RX ADMIN — SODIUM CHLORIDE, SODIUM LACTATE, POTASSIUM CHLORIDE, AND CALCIUM CHLORIDE 150 ML/HR: 600; 310; 30; 20 INJECTION, SOLUTION INTRAVENOUS at 21:11

## 2019-05-21 RX ADMIN — SODIUM CHLORIDE, SODIUM LACTATE, POTASSIUM CHLORIDE, AND CALCIUM CHLORIDE 150 ML/HR: 600; 310; 30; 20 INJECTION, SOLUTION INTRAVENOUS at 14:32

## 2019-05-21 RX ADMIN — TRAZODONE HYDROCHLORIDE 25 MG: 50 TABLET ORAL at 23:37

## 2019-05-21 RX ADMIN — CEFAZOLIN 1 G: 1 INJECTION, POWDER, FOR SOLUTION INTRAMUSCULAR; INTRAVENOUS at 17:46

## 2019-05-21 RX ADMIN — LIDOCAINE HYDROCHLORIDE 10 ML: 20 SOLUTION ORAL; TOPICAL at 17:46

## 2019-05-21 RX ADMIN — CEFAZOLIN 1 G: 1 INJECTION, POWDER, FOR SOLUTION INTRAMUSCULAR; INTRAVENOUS at 10:11

## 2019-05-21 RX ADMIN — SODIUM CHLORIDE, SODIUM LACTATE, POTASSIUM CHLORIDE, AND CALCIUM CHLORIDE 150 ML/HR: 600; 310; 30; 20 INJECTION, SOLUTION INTRAVENOUS at 07:12

## 2019-05-21 NOTE — PROGRESS NOTES
Gastrointestinal Progress Note    Patient Name: Nehal Bhagat    SJPNP'L Date: 5/21/2019    Admit Date: 5/17/2019     Assessment:   1) Acute liver injury with AST>ALT 8740/3237; aminotransferases steadily falling and overall pattern most consistent with ischemic hepatitis  2) Persistent tachycardia; Improving, likely multifactoria: infection, pain  3) Liver cancer, s/p prior treatments over the past several years, currently off therapy with progression of disease suggested by recent CT scan  4) Cirrhosis, presumably due to hepatitis C treated in the past  5) Recent falls, details unclear; ?period of time down at home  6) Recent weakness  7) Right posterior tibial vein thrombosis  8) Elevated troponin without chest pain  9) Aspiration on speech evaluation      Recommendation:   -Monitor liver enzymes, INR, and mental status  -Avoid hypotension  -Patient is electing to continue to eat for comfort despite aspiration risk  -Little else to add from GI standpoint, will sign off and be available as needed  -He should f/u as an outpatient with Dr Sridhar Ang and LEONCIO for his liver cancer    Subjective:     Nehal Bhagat is a 68 y.o. Male seen for  acute liver injury in the setting of cirrhosis and liver cancer. Tachycardia is persistent but improved. Has been taking in PO despite speech therapy findings and risk of aspiration. Notes indicate he is transitioning to hospice care. No new complaints. Mesenteric duplex shows patent hepatic veins and artery.        Current Facility-Administered Medications   Medication Dose Route Frequency    magic mouthwash  10 mL Oral Q6H    insulin lispro (HUMALOG) injection   SubCUTAneous AC&HS    glucose chewable tablet 16 g  4 Tab Oral PRN    glucagon (GLUCAGEN) injection 1 mg  1 mg IntraMUSCular PRN    dextrose (D50) infusion 12.5-25 g  25-50 mL IntraVENous PRN    ceFAZolin (ANCEF) 1 g in 0.9% sodium chloride (MBP/ADV) 50 mL MBP  1 g IntraVENous Q8H    polyethylene glycol (MIRALAX) packet 17 g  17 g Oral DAILY PRN    bisacodyl (DULCOLAX) tablet 10 mg  10 mg Oral DAILY PRN    traMADol (ULTRAM) tablet 50 mg  50 mg Oral DAILY PRN    traZODone (DESYREL) tablet 25 mg  25 mg Oral QHS PRN    albuterol-ipratropium (DUO-NEB) 2.5 MG-0.5 MG/3 ML  3 mL Nebulization Q6H PRN    methylPREDNISolone (PF) (SOLU-MEDROL) injection 4 mg  4 mg IntraVENous ACB&D    HYDROmorphone (DILAUDID) syringe 0.5 mg  0.5 mg IntraVENous Q4H PRN    nicotine (NICODERM CQ) 21 mg/24 hr patch 1 Patch  1 Patch TransDERmal DAILY    sodium chloride (NS) flush 5-10 mL  5-10 mL IntraVENous PRN    sodium chloride (NS) flush 5-10 mL  5-10 mL IntraVENous PRN    lactated Ringers infusion  150 mL/hr IntraVENous CONTINUOUS          Objective:     Physical Exam:    Visit Vitals  /77 (BP 1 Location: Left arm, BP Patient Position: Supine)   Pulse (!) 115   Temp 98.3 °F (36.8 °C)   Resp 18   Ht 5' 6\" (1.676 m)   Wt 69.5 kg (153 lb 3.5 oz)   SpO2 90%   BMI 24.73 kg/m²     Gen: Awake and alert  CV: Regular, tachycardic  Abd: Soft, nonttender without rebound/guarding. Mildly distended     Data Review:    Labs: Results:       Chemistry Recent Labs     05/20/19  0410 05/19/19  0230   * 165*    142   K 4.3 3.8   * 109*   CO2 24 22   BUN 16 22*   CREA 0.48* 0.71   CA 7.9* 8.0*   AGAP 8 11   BUCR 33* 31*   * 715*   TP 5.0* 5.3*   ALB 1.5* 1.7*   GLOB 3.5 3.6   AGRAT 0.4* 0.5*      CBC w/Diff Recent Labs     05/20/19  0410 05/19/19  0230   WBC 5.7 6.1   RBC 3.33* 4.00*   HGB 8.2* 10.3*   HCT 27.2* 32.8*   PLT 93* 90*   GRANS 80* 72   LYMPH 2* 2*   EOS 0 0      Coagulation Recent Labs     05/21/19  0520 05/20/19  0410   PTP 16.4* 16.2*   INR 1.3* 1.3*       Liver Enzymes Recent Labs     05/20/19 0410   TP 5.0*   ALB 1.5*   *   SGOT 1,320*   ALT 1,968*            Luisa Martinez MD  May 21, 2019

## 2019-05-21 NOTE — PROGRESS NOTES
Infectious Disease Follow-up Note      Date of Admission: 5/17/2019     Date of Note:  5/21/2019      Summary:     67 y/o CM w/ hepatocellular carcinoma s/p embolization on oral chem admitted w/ encephalopathy, fall, re-injure dislocated left wrist, shock liver, new + BSI Streptococcus. H/o treated hepatitis C, h/o portal vein thrombosis. Interval History:     Out of ICU. Has \"aches and pains all over\" also some throat / mouth pain. No fever    Current Antimicrobials: Prior Antimicrobials   Cefazolin 5/20 - 1   abx 5/18 - 3 Vanc, zosyn levofloxacin 5/17 - 0 Vancomycin 5/18 - 2      Assessment / Plan:      Pharyngitis ? Viral  - no thrush -> magic mouthwash   GB Streptococcus BSI  - 2 of 2 blcx + 5/17  - suspect from leg cellulitis  - rpt blcx 5/20 IP x 2 -> continue cefazolin  -> monitor repeat blcx x 2  -> de-escalate to po Cephalexin soon to complete 7-10 days abx   Bilateral LE cellulitis  - from infected abrasions following fall  - improving -> abx as above   Sepsis  - fever, tachycardia, bandemia present on admission  - from above cellulitis / BSI plus inflammation related to left wrist dislocation, RLE non-occlusive DVT  - fever, bandemia resolving, still tachycardic -> abx as above   Elevated Transaminases  - suspect shock liver   - improved -> per GI   Fall  - re-injured left wrist (previously injured 1 week PTA) -> per Ortho   Hepatocellular CA s/p embolizaton     h/o treated Hepatitis C     h/o portal vein thombosis        Microbiology:   5/17 blcx GBS x 2     Lines / Catheters:       piv    Patient Active Problem List   Diagnosis Code    Altered mental state R41.82    Sepsis ([address] A41.9    Wrist pain, acute, left M25.532    Dehydration E86.0    Liver failure ([address] K72.90    Liver cancer ([address] C22.9    HTN (hypertension) I10    Fall W19. Yumiko Sweetie       Current Facility-Administered Medications   Medication Dose Route Frequency    ceFAZolin (ANCEF) 1 g in 0.9% sodium chloride (MBP/ADV) 50 mL MBP 1 g IntraVENous Q8H    magic mouthwash  10 mL Oral Q6H PRN    polyethylene glycol (MIRALAX) packet 17 g  17 g Oral DAILY PRN    bisacodyl (DULCOLAX) tablet 10 mg  10 mg Oral DAILY PRN    traMADol (ULTRAM) tablet 50 mg  50 mg Oral DAILY PRN    traZODone (DESYREL) tablet 25 mg  25 mg Oral QHS PRN    albuterol-ipratropium (DUO-NEB) 2.5 MG-0.5 MG/3 ML  3 mL Nebulization Q6H PRN    methylPREDNISolone (PF) (SOLU-MEDROL) injection 4 mg  4 mg IntraVENous ACB&D    glucose chewable tablet 16 g  4 Tab Oral PRN    glucagon (GLUCAGEN) injection 1 mg  1 mg IntraMUSCular PRN    dextrose (D50) infusion 12.5-25 g  25-50 mL IntraVENous PRN    HYDROmorphone (DILAUDID) syringe 0.5 mg  0.5 mg IntraVENous Q4H PRN    nicotine (NICODERM CQ) 21 mg/24 hr patch 1 Patch  1 Patch TransDERmal DAILY    sodium chloride (NS) flush 5-10 mL  5-10 mL IntraVENous PRN    sodium chloride (NS) flush 5-10 mL  5-10 mL IntraVENous PRN    lactated Ringers infusion  150 mL/hr IntraVENous CONTINUOUS           Review of Systems   Constitutional: Negative for fever. HENT: Positive for sore throat. Eyes: Negative. Respiratory: Negative. Cardiovascular: Negative. Gastrointestinal: Negative. Genitourinary: Negative. Musculoskeletal: Positive for back pain and myalgias. Neurological: Negative. Endo/Heme/Allergies: Negative. Psychiatric/Behavioral: Negative. Objective:     Visit Vitals  /85   Pulse (!) 104   Temp 97.5 °F (36.4 °C)   Resp 20   Ht 5' 6\" (1.676 m)   Wt 69.5 kg (153 lb 3.5 oz)   SpO2 93%   BMI 24.73 kg/m²       Temp (24hrs), Av.4 °F (36.9 °C), Min:97.5 °F (36.4 °C), Max:99.1 °F (37.3 °C)       General: Well developed, well nourished 68 y.o.  male in no acute distress.   HEENT: no scleral icterus, no oral lesions, few ulcers, yellow discharge pharynx (not thrush)  Neck: supple, symmetrical, trachea midline   Cardio:  regular rate and rhythm, S1, S2 normal, no murmur, click, rub or gallop  Lungs: no wheezes or rales  Abdomen: soft, RUQ tender, Liver palpable around 8 cm below RSCM  Extremities:  bilateral legs/ankles enclosed in kerlix dressings.  Exposed feet improved erythema      Lab results     Chemistry  Recent Labs     05/20/19 0410 05/19/19  0230   * 165*    142   K 4.3 3.8   * 109*   CO2 24 22   BUN 16 22*   CREA 0.48* 0.71   CA 7.9* 8.0*   AGAP 8 11   BUCR 33* 31*   * 715*   TP 5.0* 5.3*   ALB 1.5* 1.7*   GLOB 3.5 3.6   AGRAT 0.4* 0.5*       CBC w/ Diff  Recent Labs     05/20/19 0410 05/19/19  0230   WBC 5.7 6.1   RBC 3.33* 4.00*   HGB 8.2* 10.3*   HCT 27.2* 32.8*   PLT 93* 90*   GRANS 80* 72   LYMPH 2* 2*   EOS 0 0       Microbiology  All Micro Results     Procedure Component Value Units Date/Time    CULTURE, BLOOD [[id number]] Collected:  05/20/19 0920    Order Status:  Completed Specimen:  Whole Blood Updated:  05/20/19 1015    CULTURE, BLOOD [[id number]] Collected:  05/20/19 0920    Order Status:  Completed Specimen:  Whole Blood Updated:  05/20/19 1014    CULTURE, BLOOD [[id number]]  (Abnormal)  (Susceptibility) Collected:  05/17/19 1220    Order Status:  Completed Specimen:  Blood Updated:  05/19/19 0853     Special Requests: PERIPHERAL        GRAM STAIN       GRAM POSITIVE COCCI IN PAIRS IN CHAINS AEROBIC AND ANAEROBIC BOTTLES                  SMEAR CALLED TO AND CORRECTLY REPEATED BY: REDD GONZALES ON [id number] AT 2024 TO LILLIAN           Culture result:       AEROBIC AND ANAEROBIC BOTTLES STREPTOCOCCI, BETA HEMOLYTIC GROUP B          CULTURE, BLOOD [[id number]]  (Abnormal) Collected:  05/17/19 1235    Order Status:  Completed Specimen:  Blood Updated:  05/19/19 0737     Special Requests: PERIPHERAL        GRAM STAIN       GRAM POSITIVE COCCI IN PAIRS IN CHAINS AEROBIC AND ANAEROBIC BOTTLES                  SMEAR CALLED TO AND CORRECTLY REPEATED BY: REDD GONZALES ON [id number] AT 2217 TO LILLIAN            Aerobic bottle called to [address] RN Loc 2600 on 05/18/19 at 1020 by Debby Sandoval     Culture result:       AEROBIC AND ANAEROBIC BOTTLES STREPTOCOCCI, BETA HEMOLYTIC GROUP B          CULTURE, BODY FLUID W Hiren Reynold [[id number]] Collected:  05/18/19 1045    Order Status:  Canceled Specimen:  Synovial Fluid     CULTURE, ANAEROBIC [[id number]] Collected:  05/18/19 1045    Order Status:  Canceled Specimen:   Body fld              Vijay Islas MD, [facility]  Infectious Disease Specialist  Pager [phone]

## 2019-05-21 NOTE — PROGRESS NOTES
PT orders received and chart reviewed. PT evaluation attempted x 2 (3211 and 6137). First attempt, patient confused and perseverating on mouthwash.  bpm. Second attempt, patient  bpm while sleeping. Not appropriate for mobility. Will f/u with patient on 5/22.     Hyacnith Montejo PT, DPT  Office extension: [phone]  Pager #: [phone]

## 2019-05-21 NOTE — MANAGEMENT PLAN
Discharge/Transition Planning     Problem: Discharge Planning  Goal: *Discharge to safe environment  Outcome: Resolved/Met   Hospice    1100: Spoke with Dr Pipo Woodard and updated on transition plan    [phone]: Called and left voicemail with [facility] to determine what steps need to occur for admission. Per pt, he had been accepted already    1400: Spoke with Daja Liao with Josemanuel rodriguez and they will be using [facility]. She asked for DNR, Hospice consult, and notes be faxed over. Notified her we have DNR order, but Dr Pipo Woodard needs to complete the paper DNR. Plan is to send pt tomorrow. Called Dr Pipo Woodard and will complete the DNR. Spoke with pt and he was receiving wound care and had received dilaudid. Able to get verbal for [facility], but pt not keeping eyes open to sign a DNR form. [facility] list has been given to the patient and/or patient representative. Patient and/or patient representative has signed the [place] of Choice selecting ___[facility]______________________as their preference agency and a copy givenFreedom of Choice have been placed on the chart. Uploaded in [employer] and matched to [address] [place] [address].  Faxed documents to Josemanuel Somers with Abbey Corea RN BSN  Outcomes Manager  Pager # [phone]

## 2019-05-21 NOTE — PROGRESS NOTES
Problem: Pressure Injury - Risk of  Goal: *Prevention of pressure injury  Description  Document Moustapha Scale and appropriate interventions in the flowsheet. Outcome: Progressing Towards Goal     Problem: Patient Education: Go to Patient Education Activity  Goal: Patient/Family Education  Outcome: Progressing Towards Goal     Problem: General Medical Care Plan  Goal: *Vital signs within specified parameters  Outcome: Progressing Towards Goal  Goal: *Labs within defined limits  Outcome: Progressing Towards Goal  Goal: *Absence of infection signs and symptoms  Outcome: Progressing Towards Goal  Goal: *Optimal pain control at patient's stated goal  Outcome: Progressing Towards Goal  Goal: *Skin integrity maintained  Outcome: Progressing Towards Goal  Goal: *Fluid volume balance  Outcome: Progressing Towards Goal  Goal: *Optimize nutritional status  Outcome: Progressing Towards Goal  Goal: *Anxiety reduced or absent  Outcome: Progressing Towards Goal  Goal: *Progressive mobility and function (eg: ADL's)  Outcome: Progressing Towards Goal     Problem: Pain  Goal: *Control of Pain  Outcome: Progressing Towards Goal  Goal: *PALLIATIVE CARE:  Alleviation of Pain  Outcome: Progressing Towards Goal     Problem: Infection - Risk of, Urinary Catheter-Associated Urinary Tract Infection  Goal: *Absence of infection signs and symptoms  Outcome: Progressing Towards Goal     Problem: Falls - Risk of  Goal: *Absence of Falls  Description  Document Rian Fall Risk and appropriate interventions in the flowsheet.   Outcome: Progressing Towards Goal     Problem: Patient Education: Go to Patient Education Activity  Goal: Patient/Family Education  Outcome: Progressing Towards Goal     Problem: Patient Education: Go to Patient Education Activity  Goal: Patient/Family Education  Outcome: Progressing Towards Goal     Problem: Nutrition Deficit  Goal: *Optimize nutritional status  Outcome: Progressing Towards Goal

## 2019-05-21 NOTE — PROGRESS NOTES
1900 Bedside, Verbal and Written shift change report given to Keith (oncoming nurse) by tawanna (offgoing nurse). Report included the following information SBAR, Kardex, Intake/Output, MAR and Recent Results. Reviewed safety precautions and plan of care including ongoing evaluation of stable condition since recent ICU transfer. Reviewed DNR status, reviewed aspiration risk and patient preferences. Florez removed 1600 du to void by 2200      2000 pt voided 200 cc clear radha urine without difficulty. Pt turned and positioned with wedges    2200 PM medications administered by IV, no PO medications ordered d/t high risk of aspiration. pt tolerated with ease, pt turned and positioned with wedges, will continue to monitor.     0000 Shift reassessment, pt condition unchanged, pt sleeping, pt turned and positioned with wedges will continue to monitor. 0200 episode of incontinence of bowel and bladder. Small formed stool. Linens and gown changed, skin care provided. Pt turned and positioned with wedges. Pt sleeping between care.      0400  Shift reassessment, pt condition unchanged, pt turned and positioned with wedges. will continue to monitor.        0600  Episode of incontinence of bowel and bladder. Incontinence care provided. Pt turned and positioned with wedges. Will continue to monitor.      0700 Bedside, Verbal and Written shift change report given to vinod  (oncoming nurse) by jacqueline (offgoing nurse). Report included the following information SBAR, Kardex, Intake/Output, MAR and Recent Results. Skin assessment completed. 0800 pt turned and positioned with wedges.

## 2019-05-21 NOTE — PROGRESS NOTES
Speech Therapy Note:    SLP attempted follow up this day. Pt now accepted to hospice care. SLP will discharge patient from caseload on safest least restrictive diet. Thank you for this referral.    Joao Parker.  Felipe Edmondson M.S., [facility]  Ph: [phone]

## 2019-05-21 NOTE — ROUTINE PROCESS
Assume care of patient from REDD Goodman. Patient received in bed resting. Patient is drowsy and orientated to self, no pain, discomfort or distress noted. Bed locked in low position. Call bell within reach. 1240- At bedside with patient, MEWS at 3 from HR. Patient is eating and sitting up at bedside, no distress noted. Will inform MD of MEWS. Informed MD for MEWS of 3, stated okay, will continue to monitor. 1430- Wound care performed on bilateral lower extremities. Old dressing has tan colored drainage, erythema and +2 edema present. Patient reports pain at site. PRN Dilaudid given for pain at a 8/10 and for wound care. New dressing applied with use of Dermal wound cleanser, aquacel to open areas, covered with ABD pads and kerlex wrap. DWC applied to sacrum, mepliex reapplied. Patient repositioned and pulled up and bed, made comfortable. 1500- Patient resting quietly, no signs of pain or distress noted.

## 2019-05-21 NOTE — PROGRESS NOTES
Problem: Pressure Injury - Risk of  Goal: *Prevention of pressure injury  Description  Document Moustapha Scale and appropriate interventions in the flowsheet. Outcome: Progressing Towards Goal     Problem: Patient Education: Go to Patient Education Activity  Goal: Patient/Family Education  Outcome: Progressing Towards Goal     Problem: General Medical Care Plan  Goal: *Vital signs within specified parameters  Outcome: Progressing Towards Goal  Goal: *Labs within defined limits  Outcome: Progressing Towards Goal  Goal: *Absence of infection signs and symptoms  Outcome: Progressing Towards Goal  Goal: *Optimal pain control at patient's stated goal  Outcome: Progressing Towards Goal  Goal: *Skin integrity maintained  Outcome: Progressing Towards Goal  Goal: *Fluid volume balance  Outcome: Progressing Towards Goal  Goal: *Optimize nutritional status  Outcome: Progressing Towards Goal  Goal: *Anxiety reduced or absent  Outcome: Progressing Towards Goal  Goal: *Progressive mobility and function (eg: ADL's)  Outcome: Progressing Towards Goal     Problem: Pain  Goal: *Control of Pain  Outcome: Progressing Towards Goal  Goal: *PALLIATIVE CARE:  Alleviation of Pain  Outcome: Progressing Towards Goal     Problem: Infection - Risk of, Urinary Catheter-Associated Urinary Tract Infection  Goal: *Absence of infection signs and symptoms  Outcome: Progressing Towards Goal     Problem: Falls - Risk of  Goal: *Absence of Falls  Description  Document Rian Fall Risk and appropriate interventions in the flowsheet.   Outcome: Progressing Towards Goal     Problem: Patient Education: Go to Patient Education Activity  Goal: Patient/Family Education  Outcome: Progressing Towards Goal     Problem: Patient Education: Go to Patient Education Activity  Goal: Patient/Family Education  Outcome: Progressing Towards Goal     Problem: Nutrition Deficit  Goal: *Optimize nutritional status  Outcome: Progressing Towards Goal     Problem: Lower Extremity Wound Care  Goal: *Non-infected wound: Improvement of existing wound, absence of infection, and maintenance of skin integrity  Outcome: Progressing Towards Goal  Goal: *Infected Wound: Prevention of further infection and promotion of healing  Description  Infection control procedures (eg: clean dressings, clean gloves, hand washing, precautions to isolate wound from contamination, sterile instruments used for wound debridement) should be implemented.   Outcome: Progressing Towards Goal  Goal: Interventions  Outcome: Progressing Towards Goal     Problem: Patient Education: Go to Patient Education Activity  Goal: Patient/Family Education  Outcome: Progressing Towards Goal

## 2019-05-21 NOTE — PROGRESS NOTES
Internal Medicine Progress Note        NAME: Amol Vee   :  1950  MRM:  [id number]    Date/Time: 2019        ASSESSMENT/PLAN:      # Admitted with  Sepsis ([facility] [address] (2019) with GB Strep bacteremia. ID consulted. Source is leg cellulitis. Per ID, change to cefazolin. Repeat bcx pending, if negative can de-escalate to PO keflex to complete 7-10 days abx.      # Dysphagia - MBS showed silent aspiration across all trails. Discussed with patient. Pt states he is DNR and is planning on going to hospice on discharge. He said he wants to eat and said as long as he takes his magic mouthwash his swallowing will be okay. He refuses PEG tube. I explained risks of taking PO given failed MBS, including risk of aspiration, choking, respiratory failure, hypoxia and death. He expressed understanding of risks and wishes to proceed with taking PO. Changed order to DNR and ordered speech recommended diet -- dysphagia pureed with honey thick liquids. #  Altered mental state (2019). Unclear etio. Likely toxic -  metabolic encephalopathy. CT head, ammonia level negative. Neuro-observation.   - improved mentation. Back to normal     # Wrist pain, acute, left (2019). swelling and tender. Ortho consulted. No fluid to aspirate. Ortho said no need for intervention, signed off. #  Dehydration (2019). Aggressive IVF    # Sinus tachycardia. Bolus IVF. TTE. TSH/FT4 normal , slight low T3 . Cardiology consulted     # Rhabdo. Elevated CPK. IVF . Improving CK level with hydration. Follow       # Ischemic Hepatitis presented with Liver failure, ho liver cirrhoses follow with hepatologist . GI consulted. Hydration. Follow labs. Check PT/INR.    - improving AST/ALT     # Coagulopathy due to liver disease. # Liver cancer ([facility] [address] (2019). Will check AFP. GI on board     #  HTN (hypertension) (2019). Control BP     #  Falls. PT/OT when stable     # TCP. ?  Liver disease related    # Hypoalbuminemia    # Musculoskeletal pains from fals. Pain control    #  Acute non-occlusive thrombus present in 1 of 2 right posterior tibial vein(s). His INR elevated.      # APS involved. Lab Review:     Recent Labs     05/20/19 0410 05/19/19 0230   WBC 5.7 6.1   HGB 8.2* 10.3*   HCT 27.2* 32.8*   PLT 93* 90*     Recent Labs     05/21/19  0520 05/20/19 0410 05/19/19 0230   NA  --  142 142   K  --  4.3 3.8   CL  --  110* 109*   CO2  --  24 22   GLU  --  161* 165*   BUN  --  16 22*   CREA  --  0.48* 0.71   CA  --  7.9* 8.0*   MG  --  2.0 2.1   PHOS  --  2.2* 1.9*   ALB  --  1.5* 1.7*   TBILI  --  1.6* 1.5*   SGOT  --  1,320* 3,305*   ALT  --  1,968* 3,140*   INR 1.3* 1.3* 1.5*     Lab Results   Component Value Date/Time    Glucose (POC) 158 (H) 05/21/2019 12:09 AM    Glucose (POC) 203 (H) 05/20/2019 07:10 PM    Glucose (POC) 83 05/18/2019 08:49 AM     No results for input(s): PH, PCO2, PO2, HCO3, FIO2 in the last 72 hours. Recent Labs     05/21/19 0520 05/20/19 0410 05/19/19 0230   INR 1.3* 1.3* 1.5*       No results found for: SDES  Lab Results   Component Value Date/Time    Culture result: (A) 05/17/2019 12:35 PM     AEROBIC AND ANAEROBIC BOTTLES STREPTOCOCCI, BETA HEMOLYTIC GROUP B    Culture result: (A) 05/17/2019 12:20 PM     AEROBIC AND ANAEROBIC BOTTLES STREPTOCOCCI, BETA HEMOLYTIC GROUP B    Culture result: NO GROWTH 6 DAYS 03/28/2018 08:00 PM       All Cardiac Markers in the last 24 hours:   Lab Results   Component Value Date/Time     05/21/2019 05:20 AM     ABG: No results found for: PH, PHI, PCO2, PCO2I, PO2, PO2I, HCO3, HCO3I, FIO2, FIO2I  Liver Panel:   No results found for: ALB, CBIL, TBIL, TP, GLOB, AGRAT, SGOT, ASTPOC, ALTPOC, ALT, GPT, AP           Subjective:     Chief Complaint:      Pt requests magic mouthwash scheduled for sore throat. Denies complaints. Objective:     Vitals:  Last 24hrs VS reviewed since prior progress note.  Most recent are:    Visit Vitals  /85   Pulse (!) 104   Temp 97.5 °F (36.4 °C)   Resp 20   Ht 5' 6\" (1.676 m)   Wt 69.5 kg (153 lb 3.5 oz)   SpO2 93%   BMI 24.73 kg/m²     SpO2 Readings from Last 6 Encounters:   05/21/19 93%   03/04/19 100%   10/03/18 96%   03/28/18 95%    O2 Flow Rate (L/min): 4 l/min       Intake/Output Summary (Last 24 hours) at 5/21/2019 1108  Last data filed at 5/21/2019 8793  Gross per 24 hour   Intake 3410 ml   Output 480 ml   Net 2930 ml          Physical Exam:   Gen:   Appear stated age, Well-developed,   in no acute distress  HEENT:  Head atraumatic, normocephalic , hearing intact to voice, dry mucous membranes. Neck:  Supple, no masses I appreciate, thyroid non-tender. Resp:  No accessory muscle use,Bilateral BS present, clear breath sounds without wheezes rales or rhonchi  Card:  No murmurs, normal S1, S2 without thrills, bruits. +2-3  Leg peripheral edema. Abd:  Soft, non-tender, non-distended, normoactive bowel sounds are present, no palpable organomegaly   Musc: L wrist swelling tender,  No cyanosis or clubbing. Skin: multiple skin abrasions and small ulcers indicate falls,  No rashes   skin turgor is good.   Neuro: Alert,    Cranial nerves are grossly intact, no clear area of focal motor weakness, can follows commands appropriately         Telemetry reviewed:   normal sinus rhythm    Medications Reviewed: (see below)    Lab Data Reviewed: (see below)    ______________________________________________________________________    Medications:     Current Facility-Administered Medications   Medication Dose Route Frequency    magic mouthwash  10 mL Oral Q6H    insulin lispro (HUMALOG) injection   SubCUTAneous AC&HS    glucose chewable tablet 16 g  4 Tab Oral PRN    glucagon (GLUCAGEN) injection 1 mg  1 mg IntraMUSCular PRN    dextrose (D50) infusion 12.5-25 g  25-50 mL IntraVENous PRN    ceFAZolin (ANCEF) 1 g in 0.9% sodium chloride (MBP/ADV) 50 mL MBP  1 g IntraVENous Q8H    polyethylene glycol (MIRALAX) packet 17 g  17 g Oral DAILY PRN    bisacodyl (DULCOLAX) tablet 10 mg  10 mg Oral DAILY PRN    traMADol (ULTRAM) tablet 50 mg  50 mg Oral DAILY PRN    traZODone (DESYREL) tablet 25 mg  25 mg Oral QHS PRN    albuterol-ipratropium (DUO-NEB) 2.5 MG-0.5 MG/3 ML  3 mL Nebulization Q6H PRN    methylPREDNISolone (PF) (SOLU-MEDROL) injection 4 mg  4 mg IntraVENous ACB&D    HYDROmorphone (DILAUDID) syringe 0.5 mg  0.5 mg IntraVENous Q4H PRN    nicotine (NICODERM CQ) 21 mg/24 hr patch 1 Patch  1 Patch TransDERmal DAILY    sodium chloride (NS) flush 5-10 mL  5-10 mL IntraVENous PRN    sodium chloride (NS) flush 5-10 mL  5-10 mL IntraVENous PRN    lactated Ringers infusion  150 mL/hr IntraVENous CONTINUOUS          Total time spent with patient: 35 minutes                  Care Plan discussed with: Patient, Nursing Staff, Consultant/Specialist and >50% of time spent in counseling and coordination of care  GI    Discussed:  Care Plan    Prophylaxis:  SCD's    Disposition:  TD             Attending Physician: Valencia Givens MD

## 2019-05-21 NOTE — PROGRESS NOTES
Nutrition initial assessment/  Plan of care      RECOMMENDATIONS:     1. QOL diet per SLP (Pureed diet; NTL)  2. SF CIB BID  3. Monitor weight, labs and PO intake  4. RD to follow     GOALS:     1. PO intake meets >75% of protein/calorie needs by 5/26  2. Weight Maintenance (+/- 1-2 lb by 5/26)    ASSESSMENT:     Weight status is classified as normal per BMI of 24.7. PO intake is variable per vitals. Will add SF CIB BID for additional calories/protein. Labs noted. Patient with hypoalbuminemia, hypocalcemia, hypophosphatemia. Elevated bilirubin and LFT's. Nutrition recommendations listed. RD to follow. Nutrition Diagnoses:   Difficulty chewing/swallowing related to dysphagia as evidenced by SLP eval and modified diet texture order. Nutrition Risk:  [] High  [x] Moderate []  Low    SUBJECTIVE/OBJECTIVE:      Transferred from ICU. Admitted with septic shock. Has history of cancer, liver failure and HTN. Patient with multiple wounds per nursing documentation. S/P MBS with silent aspiration across all trials per SLP note. SLP recommends NPO with PEG vs comfort QOL diet. Patient declined PEG placement and wanted to eat per MD note. Observed 25% intake of lunch meal during visit. Variable intake per vitals. Patient complains of back pain but no other complaints. Encouraged adequate intake. Noted plan for discharge home with hospice. Information Obtained from:    [x] Chart Review   [x] Patient   [] Family/Caregiver   [] Nurse/Physician   [] Interdisciplinary Meeting/Rounds    Diet: Pureed diet; NTL  Medications: [x] Reviewed (IVF: lactated ringers infusion at 150 ml/hr; Solu-medrol, Magic mouthwash)   Allergies: [x] Reviewed   Encounter Diagnoses     ICD-10-CM ICD-9-CM   1. Septic shock (HCC) A41.9 038.9    R65.21 785.52     995.92   2. Acute liver failure with hepatic coma (HCC) K72.01 570     572.2   3.  Encephalopathy G93.40 348.30     Past Medical History:   Diagnosis Date    Cancer ([facility] [address]     Hypertension       Labs:    Lab Results   Component Value Date/Time    Sodium 142 05/20/2019 04:10 AM    Potassium 4.3 05/20/2019 04:10 AM    Chloride 110 (H) 05/20/2019 04:10 AM    CO2 24 05/20/2019 04:10 AM    Anion gap 8 05/20/2019 04:10 AM    Glucose 161 (H) 05/20/2019 04:10 AM    BUN 16 05/20/2019 04:10 AM    Creatinine 0.48 (L) 05/20/2019 04:10 AM    Calcium 7.9 (L) 05/20/2019 04:10 AM    Magnesium 2.0 05/20/2019 04:10 AM    Phosphorus 2.2 (L) 05/20/2019 04:10 AM    Albumin 1.5 (L) 05/20/2019 04:10 AM     Anthropometrics: BMI (calculated): 24.7  Last 3 Recorded Weights in this Encounter    05/17/19 1637 05/19/19 0914 05/20/19 1814   Weight: 62.7 kg (138 lb 3.7 oz) 62.6 kg (138 lb) 69.5 kg (153 lb 3.5 oz)      Ht Readings from Last 1 Encounters:   05/20/19 5' 6\" (1.676 m)     Weight Metrics 5/20/2019 3/4/2019 10/3/2018   Weight 153 lb 3.5 oz 113 lb 151 lb   BMI 24.73 kg/m2 18.24 kg/m2 24.37 kg/m2     Patient Vitals for the past 100 hrs:   % Diet Eaten   05/20/19 1907 50 %   05/19/19 0400 25 %   05/18/19 2000 75 %   05/18/19 0400 100 %   05/17/19 1637 0 %     IBW: 142 lb %IBW: 108%    [] Weight Loss [x] Weight Gain [] Weight Stable    Estimated Nutrition Needs: [x] MSJ    Calories: 1811 Kcal Based on:   [x] Actual BW    Protein:   80-90 g Based on:   [x] Actual BW    Fluid:       7277-8426 ml Based on:   [x] Actual BW      [x] No Cultural, Caodaism or ethnic dietary need identified.     [] Cultural, Caodaism and ethnic food preferences identified and addressed     Wt Status:  [x] Normal (18.6 - 24.9) [] Underweight (<18.5) [] Overweight (25 - 29.9) [] Mild Obesity (30 - 34.9)  [] Moderate Obesity (35 - 39.9) [] Morbid Obesity (40+)     Nutrition Problems Identified:   [x] Variable PO intake   [] Food Allergies  [x] Difficulty chewing/swallowing/poor dentition  [] Constipation/Diarrhea   [] Nausea/Vomiting   [] None  [x] Other: Wound healing     Plan:   [x] Therapeutic Diet  []  Obtained/adjusted food preferences/tolerances and/or snacks options   [x]  Supplements added   [x]  SLP therapy following for feeding techniques  []  HS snack added   [x]  Modify diet texture   []  Modify diet for food allergies   []  Assist with menu selection   [x]  Monitor PO intake on meal rounds   [x]  Continue inpatient monitoring and intervention   []  Participated in discharge planning/Interdisciplinary rounds/Team meetings   []  Other:     Education Needs:   [] Not appropriate for teaching at this time due to:   [x] Identified and addressed    Nutrition Monitoring and Evaluation:  [x] Continue ongoing monitoring and intervention  [] Other    Rebecca Ramirez

## 2019-05-22 VITALS
DIASTOLIC BLOOD PRESSURE: 86 MMHG | RESPIRATION RATE: 16 BRPM | HEART RATE: 142 BPM | SYSTOLIC BLOOD PRESSURE: 127 MMHG | HEIGHT: 66 IN | WEIGHT: 153.22 LBS | OXYGEN SATURATION: 94 % | TEMPERATURE: 97.6 F | BODY MASS INDEX: 24.62 KG/M2

## 2019-05-22 LAB
CK SERPL-CCNC: 150 U/L (ref 39–308)
GLUCOSE BLD STRIP.AUTO-MCNC: 110 MG/DL (ref 70–110)
GLUCOSE BLD STRIP.AUTO-MCNC: 130 MG/DL (ref 70–110)
INR PPP: 1.4 (ref 0.8–1.2)
PROTHROMBIN TIME: 16.9 SEC (ref 11.5–15.2)

## 2019-05-22 PROCEDURE — 82550 ASSAY OF CK (CPK): CPT

## 2019-05-22 PROCEDURE — 74011250636 HC RX REV CODE- 250/636: Performed by: INTERNAL MEDICINE

## 2019-05-22 PROCEDURE — 74011000258 HC RX REV CODE- 258: Performed by: INTERNAL MEDICINE

## 2019-05-22 PROCEDURE — 85610 PROTHROMBIN TIME: CPT

## 2019-05-22 PROCEDURE — 74011000250 HC RX REV CODE- 250: Performed by: INTERNAL MEDICINE

## 2019-05-22 PROCEDURE — 82962 GLUCOSE BLOOD TEST: CPT

## 2019-05-22 PROCEDURE — 74011250637 HC RX REV CODE- 250/637: Performed by: INTERNAL MEDICINE

## 2019-05-22 PROCEDURE — 36415 COLL VENOUS BLD VENIPUNCTURE: CPT

## 2019-05-22 RX ORDER — CEPHALEXIN 750 MG/1
750 CAPSULE ORAL 4 TIMES DAILY
Qty: 28 CAP | Refills: 0 | Status: SHIPPED | OUTPATIENT
Start: 2019-05-22 | End: 2019-05-29

## 2019-05-22 RX ADMIN — LIDOCAINE HYDROCHLORIDE 10 ML: 20 SOLUTION ORAL; TOPICAL at 06:48

## 2019-05-22 RX ADMIN — CEFAZOLIN 1 G: 1 INJECTION, POWDER, FOR SOLUTION INTRAMUSCULAR; INTRAVENOUS at 03:06

## 2019-05-22 RX ADMIN — METHYLPREDNISOLONE SODIUM SUCCINATE 4 MG: 40 INJECTION, POWDER, FOR SOLUTION INTRAMUSCULAR; INTRAVENOUS at 13:00

## 2019-05-22 RX ADMIN — LIDOCAINE HYDROCHLORIDE 10 ML: 20 SOLUTION ORAL; TOPICAL at 12:30

## 2019-05-22 RX ADMIN — CEFAZOLIN 1 G: 1 INJECTION, POWDER, FOR SOLUTION INTRAMUSCULAR; INTRAVENOUS at 12:28

## 2019-05-22 NOTE — PROGRESS NOTES
Transportation at Discharge: 5/22/19    Transport Company/Representative:  Cherise Garrido / Vik Espinoza number: [phone]  Method of Transport: [facility] / BLS    Estimated pick-up time: 2:00P  Destination: [facility]: Jason Viera / Leigh Simon: No auth required    Requesting Outcomes Manager: Kallie Lopez, Care- ext [phone]

## 2019-05-22 NOTE — ANCILLARY DISCHARGE INSTRUCTIONS
Patient and/or next of kin has been given the [facility] Important Message From Medicare About Your Rights\" letter and all questions were answered. Paper copy signed.

## 2019-05-22 NOTE — PROGRESS NOTES
Patient discharging home with hospice. Will d/c PT orders at this time.      Thank you fort this referral,     Neftaly Henriquez PT, DPT  Office extension: [phone]  Pager #: [phone]

## 2019-05-22 NOTE — ROUTINE PROCESS
Bedside and Verbal shift change report given to Eli Perkins RN (oncoming nurse) by Everett Francisco RN (offgoing nurse). Report included the following information SBAR, Kardex, Intake/Output, MAR and Recent Results.

## 2019-05-22 NOTE — DISCHARGE SUMMARY
Discharge Summary    Patient: Araseli Jones               Sex: male          DOA: 5/17/2019       YOB: 1950      Age:  68 y.o.        LOS:  LOS: 5 days                Admit Date: 5/17/2019    Discharge Date: 5/22/2019    Admission Diagnoses: Sepsis [facility]) [A41.9]    Discharge Diagnoses:    Hospital Problems  Date Reviewed: 10/3/2018          Codes Class Noted POA    Altered mental state ICD-10-CM: R41.82  ICD-9-CM: 780.97  5/17/2019 Unknown        * (Principal) Sepsis ([facility].) ICD-10-CM: A41.9  ICD-9-CM: 038.9, 995.91  5/17/2019 Unknown        Wrist pain, acute, left ICD-10-CM: M25.532  ICD-9-CM: 719.43  5/17/2019 Unknown        Dehydration ICD-10-CM: E86.0  ICD-9-CM: 276.51  5/17/2019 Unknown        Liver failure ([facility] ICD-10-CM: K72.90  ICD-9-CM: 572.8  5/17/2019 Unknown        Liver cancer ([facility].) ICD-10-CM: C22.9  ICD-9-CM: 155.2  5/17/2019 Unknown        HTN (hypertension) ICD-10-CM: I10  ICD-9-CM: 401.9  5/17/2019 Unknown        Fall ICD-10-CM: W19. Charisse Mascorro  ICD-9-CM: E888.9  5/17/2019 Unknown              Discharge Condition:  Improved    Hospital Course:  69M w/ h/o HCC s/p embolization on oral chemo admitted to [facility] on 5/17 d/t encephalopathy, fall, dislocated left wrist, shock liver, rhabdomyolysis and Strep bacteremia. GI was consulted for elevated LFTs  (peak AST/ALT 8740/3237) and monitored liver enzymes and INR, and suspected ischemic hepatitis. LFTs did downtrend. CK cleared with IV fluids. ID was consulted for GB strep bacteremia, source suspected 2/2 leg cellulitis. He was treated with IV antibiotics with improvement. Blood cx cleared. Orthopedics was consulted for left wrist pain, suspected 2/2 trauma. CT left forearm negative for fracture. No evidence of septic arthritis (no fluid obtainable with image guided aspiration). Wrist splint was placed. Pain improved significantly. He underwent MBS swallow evaluation and failed with silent aspiration.  Pt declined PEG tube, elected to be DNR and transition to hospice on discharge. He wanted to take food by mouth despite his high risk of aspiration, he was informed of risk of aspiration which could leading to choking, respiratory failure, and death, and he expressed understanding of risks and still elected take PO. For comfort measures, he was ordered diet recommended by SLP: dysphagia pureed, nectar thick liquids, with meds crushed, HOB elevated while eating. He did c/o sore throat and have symptomatic improvement with magic mouthwash and ID suspected he may have viral pharyngitis, so magic mouthwash was scheduled q6h.  enrolled pt with his desired hospice agency [facility]) and he was accepted. FOLLOW UP  - Pt is to take keflex for 7 more days to complete course of treatment for bacteremia (Rx given)  - Pt is to f/u with Dr. Charles Shields and [facility] for his liver cancer       Consults:    Cardiology, Gastroenterology, Infectious Disease and Orthopedics    Labs:  Labs: Results:       Chemistry Recent Labs     05/20/19 0410   *      K 4.3   *   CO2 24   BUN 16   CREA 0.48*   CA 7.9*   AGAP 8   BUCR 33*   *   TP 5.0*   ALB 1.5*   GLOB 3.5   AGRAT 0.4*      CBC w/Diff Recent Labs     05/20/19 0410   WBC 5.7   RBC 3.33*   HGB 8.2*   HCT 27.2*   PLT 93*   GRANS 80*   LYMPH 2*   EOS 0      Cardiac Enzymes Recent Labs     05/22/19 0415 05/21/19  0520    126      Coagulation Recent Labs     05/22/19 0415 05/21/19  0520   PTP 16.9* 16.4*   INR 1.4* 1.3*       Lipid Panel No results found for: CHOL, CHOLPOCT, CHOLX, CHLST, CHOLV, 843152, HDL, LDL, LDLC, DLDLP, 583828, VLDLC, VLDL, TGLX, TRIGL, TRIGP, TGLPOCT, CHHD, CHHDX   BNP No results for input(s): BNPP in the last 72 hours.    Liver Enzymes Recent Labs     05/20/19 0410   TP 5.0*   ALB 1.5*   *   SGOT 1,320*      Thyroid Studies Lab Results   Component Value Date/Time    T4, Total 4.8 05/19/2019 06:50 AM    TSH 1.19 05/19/2019 06:50 AM            Significant Diagnostic Studies:   Xr Wrist Rt Ap/lat    Result Date: 5/17/2019  EXAM: Right Wrist x-rays 2 views. AP, Lateral INDICATION: Wrist pain COMPARISON: None ___________ FINDINGS: There are no acute fractures or subluxation. There is chondrocalcinosis suggesting CPPD. There is normal bone mineralization. ____________     IMPRESSION: No acute fractures or subluxation of right Wrist. CPPD     Xr Wrist Lt Ap/lat    Result Date: 5/17/2019  EXAM: Left Wrist x-rays 2 views. AP, Lateral INDICATION: Left wrist pain COMPARISON: None ___________ FINDINGS: There are no acute fractures. On the lateral view there is posterior displacement of the ulna in relation to the radius concerning for distal radioulnar joint subluxation. Soft tissues are unremarkable. There is normal bone mineralization. ____________     IMPRESSION: No acute fractures of left Wrist. Posterior displacement of the distal ulna in relation to the radius concerning for distal radial ulnar joint subluxation. Xr Swallow Func Video    Result Date: 5/20/2019  EXAM: MODIFIED BARIUM SWALLOW CLINICAL INDICATION/HISTORY: Oropharyngeal dysphagia, feeding difficulty COMPARISON: None. TECHNIQUE: With the patient in the seated lateral position, fluoroscopy was performed. Thin, nectar, honey and pudding barium consistencies were administered. _______________ FINDINGS: Delayed swallowing with premature spillage and residuals in the vallecula and piriform sinus noted with all consistencies. Penetration to the vocal cords was noted with thin barium through a cup. Silent aspiration with the residuals noted with all the consistencies. Radiation dose (reference air kerma): 9.5 mGy _______________     IMPRESSION: Abnormal modified barium swallow demonstrated silent aspiration from the residuals in the vallecula and piriform sinus. Please see speech pathologist report for further detail.       Xr Knees Bi Ap Lat    Result Date: 5/17/2019  EXAM:  XR KNEES BI AP LAT INDICATION: Left knee pain falls COMPARISON: None. FINDINGS: 2 views of the right knee demonstrate no fracture, effusion or other acute osseous, articular or soft tissue abnormality. Mild degenerative changes are present with meniscal calcifications present and small marginal osteophytes of the medial and lateral compartments. IMPRESSION: Mild chronic/degenerative changes as noted. No acute findings. Ct Head Wo Cont    Result Date: 5/17/2019  EXAM: CT head INDICATION: Fall with inability to get up. Liver carcinoma COMPARISON: None. TECHNIQUE: Axial CT imaging of the head was performed without intravenous contrast. Coronal and sagittal reconstructions were performed. One or more dose reduction techniques were used on this CT: automated exposure control, adjustment of the mAs and/or kVp according to patient size, and iterative reconstruction techniques. The specific techniques used on this CT exam have been documented in the patient's electronic medical record. Digital Imaging and Communications in Medicine (DICOM) format image data are available to nonaffiliated external healthcare facilities or entities on a secure, media free, reciprocally searchable basis with patient authorization for at least a 12-month period after this study. _______________ FINDINGS: BRAIN AND POSTERIOR FOSSA: There is only mild prominence of the ventricles and sulci for patient of this age. There is symmetric. Mild white matter hypoattenuation. There is no intracranial hemorrhage, mass effect, or midline shift. Normal gray-white matter differentiation. EXTRA-AXIAL SPACES AND MENINGES: There are no abnormal extra-axial fluid collections. CALVARIUM: Intact. SINUSES: Minimal mucosal thickening of the left ethmoid sinus. Other visualized sinuses are clear. Mastoid air cells are clear. OTHER: None. _______________     IMPRESSION: 1. No evidence of acute intracranial injury, hemorrhage or fracture. No evidence of acute infarct.  2. Very mild microvascular ischemic changes. Ct Chest Abd Pelv W Cont    Result Date: 5/17/2019  EXAM: CT of the chest, abdomen, and pelvis INDICATION: Liver cancer. Found on floor. Unable to move left arm. COMPARISON: CT abdomen and pelvis 3/4/2019 TECHNIQUE: Axial CT imaging of the chest, abdomen, and pelvis was performed with intravenous contrast. Multiplanar reformats were generated. One or more dose reduction techniques were used on this CT: automated exposure control, adjustment of the mAs and/or kVp according to patient size, and iterative reconstruction techniques. The specific techniques used on this CT exam have been documented in the patient's electronic medical record. Digital Imaging and Communications in Medicine (DICOM) format image data are available to nonaffiliated external healthcare facilities or entities on a secure, media free, reciprocally searchable basis with patient authorization for at least a 12-month period after this study. _______________ FINDINGS: CHEST: LUNGS: Possible 5 mm right lower lobe nodule image 83. Just caudal to this there is some asymmetric parenchymal opacity which could represent small area of atelectasis or small infiltrate. This nodule could be related to the same process. Bilateral scattered areas of nonmass-like groundglass density. PLEURA: Normal, with no effusion or pneumothorax. AIRWAY: Probably a small amount of debris in the trachea. Airways otherwise unremarkable MEDIASTINUM: Normal heart size. No pericardial effusion. Mild atherosclerotic changes of aorta. Pulmonary arteries are unremarkable. Extensive coronary artery calcification. Esophagus is unremarkable LYMPH NODES: No enlarged lymph nodes. =============== ABDOMEN/PELVIS: LIVER, BILIARY: Liver continues to be abnormal. There is lobulated contour consistent with cirrhosis.  Ill-defined hypoattenuation in the right lobe is stable however there is a new extensive area of irregular hypodensity involving the majority of the left lobe. This is all new compared to CT from 3/14/2019. The area in question measures at least 11 cm although blood vessels running through it. No biliary dilation. Multiple gallstones are again noted without evidence of gallbladder wall thickening. The continues to be thrombus at the bifurcation of the main portal vein completely occluding the right portal vein. Left portal vein is patent. PANCREAS: Normal. SPLEEN: Normal. ADRENALS: Spleen remains mildly enlarged. KIDNEYS: Stable left renal cyst. Kidneys are otherwise unremarkable LYMPH NODES: No enlarged lymph nodes. GASTROINTESTINAL TRACT: No bowel dilation or wall thickening. No evidence of bowel obstruction. PELVIC ORGANS:  There is a Florez catheter in the bladder. Air-fluid level in the bladder probably iatrogenic. Bladder is otherwise unremarkable. Prostate is normal. VASCULATURE: Diffuse atherosclerotic changes. BONES: No acute or aggressive osseous abnormalities identified. Sclerosis involving the right humeral head unchanged consistent with avascular necrosis. OTHER: Small amount of ascites is seen in the perihepatic region extending into the pelvis. This is a new finding compared to prior CT. _______________     IMPRESSION: 1. No acute finding related to fall. 2. Possible new right lower lobe pulmonary nodule with small area of right lower lobe atelectasis or infiltrate. 3. Persistent cirrhotic appearance of the liver with ill-defined hypoattenuation in the right lobe which may represent patient's treated hepatocellular carcinoma. There is extensive new hypoattenuation involving the left lobe without obvious etiology. Cannot exclude mass on the vascular compromise related to portal vein thrombosis potentially cause this appearance over the left portal vein does appear patent. There is thrombus at the bifurcation of the main portal vein with complete occlusion of the right portal vein. 4. Cholelithiasis without evidence of acute cholecystitis.  5. New small ascites. Ct Spine Cerv Wo Cont    Result Date: 5/17/2019  EXAM: CT Cervical spine INDICATION: Cervical neck pain after patient being found down. Septic shock. COMPARISON: No prior study. TECHNIQUE: Axial CT imaging of the cervical spine was performed from the skull base to the upper thoracic spine without intravenous contrast. Multiplanar reformats were generated One or more dose reduction techniques were used on this CT: automated exposure control, adjustment of the mAs and/or kVp according to patient size, and iterative reconstruction techniques. The specific techniques used on this CT exam have been documented in the patient's electronic medical record. Digital Imaging and Communications in Medicine (DICOM) format image data are available to nonaffiliated external healthcare facilities or entities on a secure, media free, reciprocally searchable basis with patient authorization for at least a 12-month period after this study. _______________ FINDINGS: VERTEBRAE AND DISCS: Coronal reformatted images demonstrate an intact and normal appearance to the occipital condyles. Atlantodental and atlantooccipital articulations appear within normal limits. There is maintenance of usual cervical lordosis without listhesis. Vertebral body heights are maintained. No displaced fracture. Multilevel spondylosis overall mild in nature. Facet joints are normally aligned bilaterally. SPINAL CANAL AND FORAMINA: No high-grade osseous canal stenosis. Severe right-sided neuroforaminal narrowing at C5-C6 related to uncovertebral facet joint osteoarthrosis. PREVERTEBRAL SOFT TISSUES: There is no abnormal prevertebral soft tissue swelling. VISIBLE INTRACRANIAL CONTENTS: Included intracranial contents demonstrate no acute abnormality. LUNG APICES: Clear. OTHER: Atherosclerotic vascular calcifications noted throughout the neck. _______________     IMPRESSION: 1.  No evidence of fracture or acute traumatic listhesis involving the cervical spine. 2. Degenerative changes as described above. Ct Forearm Lt Wo Cont    Result Date: 5/18/2019  Examination: CT left forearm without contrast. HISTORY: Left wrist pain. Evaluation for potential disruption of the distal radial ulnar joint. TECHNIQUE: CT imaging of the left forearm was performed in the axial plane utilizing both bone and soft tissue reconstruction algorithms. Additional coronal and sagittal reformatted images performed by the technologist included in interpretation. One or more dose reduction techniques were used on this CT: automated exposure control, adjustment of the mAs and/or kVp according to patient size, and iterative reconstruction techniques. The specific techniques used on this CT exam have been documented in the patient's electronic medical record. Digital Imaging and Communications in Medicine (DICOM) format image data are available to nonaffiliated external healthcare facilities or entities on a secure, media free, reciprocally searchable basis with patient authorization for at least a 12-month period after this study. COMPARISON: Left wrist radiographs 5/17/2019. FINDINGS: Osseous alignment at the included elbow joint is within normal limits. There is mild tricompartmental elbow joint osteoarthritis, greatest across the ulnohumeral compartment. There is no evidence of joint effusion. No fracture involving the distal humerus or proximal radius/ulna. Radial and ulnar shafts are intact. No evidence of fracture. Alignment at the distal radial ulnar joint is as expected on the provided images. No associated dorsal subluxation or dislocation is present. Distal radial ulnar, intercarpal, carpometacarpal, and MCP alignment within normal limits. No evidence of fracture. Small foci of chondrocalcinosis are present within both the scapholunate ligament as well as within the jugular fibrocartilage complex. Type II lunate. No findings of intercalated segmental instability.  No evidence of fracture. No retained radiopaque foreign object. Soft tissue evaluation demonstrates normal CT appearance to the muscle bulk of the extensor and flexor compartments of the forearm. Flexor and extensor tendons appear grossly intact. No retained radiopaque foreign object. IMPRESSION: 1. No acute traumatic malalignment involving the distal radial ulnar joint. 2. No evidence of fracture or acute malalignment involving the imaged left forearm. 3. Chondrocalcinosis within the triangular fibrocartilage and scapholunate ligaments, as can be seen in the context of CPPD arthropathy. 4. Mild degenerative change at the left elbow. 5. Intact appearing flexor and extensor compartment tendons. 6. No retained radiopaque foreign object. Note: Preliminary report sent to the Emergency Department by the radiology resident at the time of the study. [id number] [facility]    Result Date: 5/18/2019  EXAM: Limited right upper quadrant abdominal ultrasound INDICATION: Elevated liver enzymes, patient with history of liver cancer. . COMPARISON: CT scan 5/17/2019. Stella Silvestre TECHNIQUE: Real-time abdomen/right upper quadrant sonography in multiple planes was performed with image documentation. Grayscale, color flow Doppler imaging, and velocity spectral waveform analysis of the portal vein was performed (duplex imaging). Examination detail is limited secondary to patient motion and limited ability for breath holding. _______________ FINDINGS: LIVER: Lobular hepatic contour with diffusely coarsened parenchymal echogenicity is present. . No discrete mass demonstrated on the provided images, with limited visualization of the left hepatic lobe. . Color flow Doppler and velocity spectral waveform analysis of the portal vein shows normal (hepatopedal) direction of flow. Portal vein is estimated at 1.3 cm in size. BILIARY SYSTEM: No intrahepatic biliary dilatation. Common bile duct is normal in caliber measuring 0.4 cm. GALLBLADDER: Gallstones within the gallbladder. No bladder wall thickness of 3 mm. Trace amount of pericholecystic fluid. Sonographic Busby sign negative. RIGHT KIDNEY: 11.5 cm in length. No hydronephrosis or renal mass. No visible calculi. Renal parenchymal echogenicity appears increased. PANCREAS: Head and body are unremarkable in appearance though the tail is obscured by overlying bowel gas. IVC: Visualized portions are unremarkable in appearance. OTHER: Trace amount of ascites. . _______________     IMPRESSION: 1. Cirrhotic hepatic morphology and trace ascites without discrete lesion demonstrated on the provided images 2. Patent portal vein with hepatopedal blood flow 3. Cholelithiasis with trace pericholecystic fluid with gallbladder wall thickness upper limits of normal; findings likely extrabiliary in etiology given associated liver disease. Negative sonographic Busby sign. Xr Chest Port    Result Date: 5/17/2019  EXAM: One-view chest CLINICAL HISTORY: History of fall, chest pain, COMPARISON: None FINDINGS: Frontal view of the chest demonstrate clear lungs. Cardiac silhouette is normal in size and contour. No acute bony or soft tissue abnormality. IMPRESSION: No acute pulmonary process identified. Xr Fluoro Guide Asp/bx/inj/loc    Result Date: 5/18/2019  LEFT WRIST ASPIRATION COMPARISON: Left wrist radiographs 5/17/2019; CT left forearm 5/17/2019. CLINICAL INFORMATION: 69-year-old with questionable history of prior trauma to the left wrist presenting with left wrist pain and swelling. Evaluation for potential septic arthritis is requested. PROCEDURE: Preliminary radiographs of the left wrist demonstrate faint chondrocalcinosis within both the scapholunate and triangular fibrocartilage complex. Joint spaces appear preserved. No fracture or acute malalignment. . GUIDANCE: Fluoroscopic guidance was used to position (and confirm the position of) the needle and contrast.  Image(s) saved in PACS: Postoperative.  After explaining the procedure to the patient, including the potential risks, benefits, and alternatives, informed written and verbal consent was obtained. The skin overlying the patient's left wrist was prepped and draped in usual sterile fashion. One percent lidocaine was infiltrated into the subcutaneous tissues of the dorsal left wrist to achieve local anesthesia. Under fluoroscopic guidance, a 20 gauge spinal needle was advanced into the joint, initially targeting the distal radial ulnar joint, subsequent targeting of the ulnar styloid recess, and finally the radiocarpal articulation. At each of these locations, aspiration was performed without fluid obtained. [A small amount of nonionic contrast was gently hand injected into the joint to confirm intra-articular position. The needle was then removed. The patient tolerated procedure well, with no initial complications. : JESENIA Dong M.D. IMPRESSION: 1. Status post uncomplicated left wrist aspiration yielding no fluid. Discharge Medications:     Current Discharge Medication List      START taking these medications    Details   diphenhydrAMINE 12.5 mg/5 mL liqd 100 mg, lidocaine 2 % soln 40 mL, alum-mag hydroxide-simeth 200-200-20 mg/5 mL susp 40 mL Take 10 mL by mouth every six (6) hours. Qty: 1 Bottle, Refills: 0      cephalexin (KEFLEX) 750 mg capsule Take 1 Cap by mouth four (4) times daily for 7 days. Qty: 28 Cap, Refills: 0         CONTINUE these medications which have NOT CHANGED    Details   methylPREDNISolone (MEDROL) 4 mg tab Take 4 mg by mouth two (2) times daily (with meals). dextroamphetamine-amphetamine (ADDERALL) 5 mg tablet Take 5 mg by mouth daily. Indications: Attention Deficit Disorder with Hyperactivity      albuterol (PROAIR HFA) 90 mcg/actuation inhaler Take 2 Puffs by inhalation every four (4) hours as needed for Wheezing or Shortness of Breath. SORAfenib (NEXAVAR) 200 mg tablet Take 400 mg by mouth every other day.  Indications: Liver Cell Cancer Benzonatate 150 mg cap Take 100 Caps by mouth three (3) times daily as needed for Cough. traZODone (DESYREL) 50 mg tablet Take 50 mg by mouth nightly. spironolactone (ALDACTONE) 50 mg tablet Take 50 mg by mouth two (2) times a day. traMADol (ULTRAM) 50 mg tablet Take 50 mg by mouth daily. Indications: Pain      alum-mag hydroxide-simeth (MYLANTA) 200-200-20 mg/5 mL susp Take 30 mL by mouth three (3) times daily (with meals). Qty: 354 mL, Refills: 0      lidocaine (LIDOCAINE VISCOUS) 2 % solution Take 15 mL by mouth as needed for Pain. Qty: 1 Bottle, Refills: 0             Activity: Activity as tolerated    Diet: pt failed MBS study, for comfort only: dysphagia pureed, nectar thick liquids, with meds crushed, aspiration precautions HOB elevated while eating.      Follow-up: Hospice         Total time spent including time spent on final examination and discharge discussion, discharge documentation and records reviewed and medication reconciliation: > 30 minutes    Jose Lipscomb MD  [facility]

## 2019-05-22 NOTE — PROGRESS NOTES
Problem: Discharge Planning  Goal: *Discharge to safe environment, [address] [facility] with [facility] 8305  5/22/2019 1413 by Denver Rands  Outcome: Resolved/Met  Discharge to [address]  with [address] with  at 200 pm. Patient and family aware of tranistion plan. DDNR signed. Care Management Interventions  PCP Verified by CM: Yes  Palliative Care Criteria Met (RRAT>21 & CHF Dx)?: No  Mode of Transport at Discharge: [facility]  Transition of Care Consult (CM Consult): Other([facility] [place] [facility]  MyChart Signup: No  Discharge Durable Medical Equipment: No  Health Maintenance Reviewed: Yes  Physical Therapy Consult: Yes  Occupational Therapy Consult: Yes  Speech Therapy Consult: Yes  Current Support Network:  Other([facility] [place] with [facility])  Confirm Follow Up Transport: Other (see comment)([facility])  Plan discussed with Pt/Family/Caregiver: Yes  Freedom of Choice Offered: Yes  [address] Provided?: No  Discharge Location  Discharge Placement: Home with hospice

## 2019-05-22 NOTE — PROGRESS NOTES
5/21/2019 20:00- Patient assessment completed- see flow sheet. Patient has no c/o pain/discomfort at this time. Continue to monitor- call light in reach. 23:45-- Heart jzzi=[id number]   Noted that patient has been frequently tachycardic- this is not new. MEWs score= 4   Also noted that there is No telemetry order- telemetry monitoring discontinued at this time. Charge nurse, Andrew Carballo RN aware and tele tech, Joseph Pulido made aware. Patient denies pain. Patient taking small sips of H2O at this time. Continue to monitor- call light in reach.

## 2019-05-22 NOTE — ROUTINE PROCESS
Bedside and Verbal shift change report given to [address] [facility] (oncoming nurse) by Patrick Berkowitz (offgoing nurse). Report included the following information SBAR, Kardex, Intake/Output, MAR and Recent Results. 0945 Pt resting in bed with no complaints of pain and no change to condition. Pt repositioned. 1120 Pt resting in bed with no complaints of pain and no change to condition. 1530 Changed dressings to bilateral lower extremities. 2640 Rupesh Steiner report to Trino yang. Report consists of situation, background, assessment, and recommendation. 1420 Pt transported via medical transpoprt.

## 2019-07-18 NOTE — PROGRESS NOTES
Problem: Communication  Goal: The ability to communicate needs accurately and effectively will improve  Outcome: PROGRESSING AS EXPECTED  Pt A&Ox4, able to make needs known; pt using call light appropriately for assistance.    Problem: Safety  Goal: Will remain free from falls  Outcome: PROGRESSING AS EXPECTED  Bed locked and in lowest position, bed alarm on. Call light and belongings within reach. Hourly rounding in place.       Problem: Pressure Injury - Risk of  Goal: *Prevention of pressure injury  Description  Document Moustapha Scale and appropriate interventions in the flowsheet. Outcome: Progressing Towards Goal     Problem: Patient Education: Go to Patient Education Activity  Goal: Patient/Family Education  Outcome: Progressing Towards Goal     Problem: General Medical Care Plan  Goal: *Vital signs within specified parameters  Outcome: Progressing Towards Goal  Goal: *Labs within defined limits  Outcome: Progressing Towards Goal  Goal: *Absence of infection signs and symptoms  Outcome: Progressing Towards Goal  Goal: *Optimal pain control at patient's stated goal  Outcome: Progressing Towards Goal  Goal: *Skin integrity maintained  Outcome: Progressing Towards Goal  Goal: *Fluid volume balance  Outcome: Progressing Towards Goal  Goal: *Optimize nutritional status  Outcome: Progressing Towards Goal  Goal: *Anxiety reduced or absent  Outcome: Progressing Towards Goal  Goal: *Progressive mobility and function (eg: ADL's)  Outcome: Progressing Towards Goal     Problem: Pain  Goal: *Control of Pain  Outcome: Progressing Towards Goal  Goal: *PALLIATIVE CARE:  Alleviation of Pain  Outcome: Progressing Towards Goal     Problem: Infection - Risk of, Urinary Catheter-Associated Urinary Tract Infection  Goal: *Absence of infection signs and symptoms  Outcome: Progressing Towards Goal     Problem: Falls - Risk of  Goal: *Absence of Falls  Description  Document Rian Fall Risk and appropriate interventions in the flowsheet.   Outcome: Progressing Towards Goal     Problem: Patient Education: Go to Patient Education Activity  Goal: Patient/Family Education  Outcome: Progressing Towards Goal     Problem: Patient Education: Go to Patient Education Activity  Goal: Patient/Family Education  Outcome: Progressing Towards Goal

## 2022-11-13 NOTE — PROGRESS NOTES
conducted a Follow up consultation and Spiritual Assessment for Nehal Bhagat, who is a 68 y.o.,male. The  provided the following Interventions:  Continued the relationship of care and support. Listened empathically. Offered prayer and assurance of continued prayer on patients behalf. Chart reviewed. The following outcomes were achieved:  Patient expressed gratitude for pastoral care visit. Assessment:  There are no further spiritual or [employer] issues which require Spiritual Care Services interventions at this time. Plan:  Chaplains will continue to follow and will provide pastoral care on an as needed/requested basis.  recommends bedside caregivers page  on duty if patient shows signs of acute spiritual or emotional distress.      [address] [facility]   Staff [address] [facility]   [phone] 2-point gait